# Patient Record
Sex: MALE | Race: WHITE | NOT HISPANIC OR LATINO | Employment: FULL TIME | URBAN - NONMETROPOLITAN AREA
[De-identification: names, ages, dates, MRNs, and addresses within clinical notes are randomized per-mention and may not be internally consistent; named-entity substitution may affect disease eponyms.]

---

## 2018-04-04 ENCOUNTER — OFFICE VISIT (OUTPATIENT)
Dept: INTERNAL MEDICINE | Facility: CLINIC | Age: 53
End: 2018-04-04

## 2018-04-04 VITALS
BODY MASS INDEX: 27.58 KG/M2 | SYSTOLIC BLOOD PRESSURE: 170 MMHG | OXYGEN SATURATION: 95 % | DIASTOLIC BLOOD PRESSURE: 110 MMHG | TEMPERATURE: 98.6 F | HEART RATE: 77 BPM | HEIGHT: 71 IN | WEIGHT: 197 LBS

## 2018-04-04 DIAGNOSIS — Z00.00 HEALTH MAINTENANCE EXAMINATION: ICD-10-CM

## 2018-04-04 DIAGNOSIS — Z72.0 TOBACCO ABUSE: ICD-10-CM

## 2018-04-04 DIAGNOSIS — I10 ESSENTIAL HYPERTENSION: Primary | ICD-10-CM

## 2018-04-04 PROCEDURE — 99214 OFFICE O/P EST MOD 30 MIN: CPT | Performed by: PHYSICIAN ASSISTANT

## 2018-04-04 PROCEDURE — 99406 BEHAV CHNG SMOKING 3-10 MIN: CPT | Performed by: PHYSICIAN ASSISTANT

## 2018-04-04 RX ORDER — LISINOPRIL 10 MG/1
10 TABLET ORAL DAILY
Qty: 30 TABLET | Refills: 5 | Status: SHIPPED | OUTPATIENT
Start: 2018-04-04 | End: 2018-11-15 | Stop reason: ALTCHOICE

## 2018-04-04 NOTE — PROGRESS NOTES
Subjective     Chief Complaint: elevated BP    History of Present Illness     Jamse Morel is a 52 y.o. male presenting with complaints of elevated BP. States his wife is a nurse, has been running 150s-170s systolic at home. Patient states he used to be on a BP med years ago, never had it refilled but blood pressure was always okay. Recently he notes an increase in stress, changes at work as well as changing from 3rd shift to 1st shift. HTN is leading to headaches, not fully relieved by tylenol. Denies any episodes of chest pain, shortness of breath, dizziness, blurry vision, palpitations, leg swelling. Denies increase in salt intake.    Also states he has felt more anxious recently, people are getting on his nerves more easily. States he was on lexapro 20-30 years ago but he didn't like the way it made him feel. Feels like if he could get the job switch and BP under control he will be less anxious.    Patient is also a smoker. Not ready to quit at this time.    The following portions of the patient's history were reviewed and updated as appropriate: current medications, allergies, PMH.    Review of Systems   Constitutional: Negative for appetite change, chills, fatigue, fever and unexpected weight change.   HENT: Negative for congestion, ear pain, hearing loss, nosebleeds, sinus pressure, sore throat, tinnitus and trouble swallowing.    Eyes: Negative for photophobia, discharge and visual disturbance.   Respiratory: Negative for cough, chest tightness, shortness of breath and wheezing.    Cardiovascular: Negative for chest pain, palpitations and leg swelling.   Gastrointestinal: Negative for abdominal distention, abdominal pain, blood in stool, constipation, diarrhea, nausea and vomiting.   Endocrine: Negative for cold intolerance, heat intolerance, polydipsia, polyphagia and polyuria.   Genitourinary: Negative for difficulty urinating, dysuria, flank pain, frequency, hematuria and urgency.   Musculoskeletal:  "Negative for arthralgias, back pain, joint swelling, myalgias, neck pain and neck stiffness.   Skin: Negative for color change, pallor, rash and wound.   Allergic/Immunologic: Negative for environmental allergies, food allergies and immunocompromised state.   Neurological: Positive for headaches. Negative for dizziness, weakness and numbness.   Hematological: Negative for adenopathy. Does not bruise/bleed easily.   Psychiatric/Behavioral: Negative for dysphoric mood, hallucinations, sleep disturbance and suicidal ideas. The patient is nervous/anxious.        Objective     Vitals:    04/04/18 1646   BP: (!) 170/110   Pulse: 77   Temp: 98.6 °F (37 °C)   SpO2: 95%   Weight: 89.4 kg (197 lb)   Height: 180.3 cm (71\")     Physical Exam   Constitutional: He is oriented to person, place, and time. He appears well-developed and well-nourished.   HENT:   Mouth/Throat: Oropharynx is clear and moist.   Eyes: Conjunctivae and EOM are normal. Pupils are equal, round, and reactive to light.   Neck: Normal range of motion. Neck supple.   Cardiovascular: Normal rate and regular rhythm.    Pulmonary/Chest: Effort normal and breath sounds normal.   Abdominal: Soft. Bowel sounds are normal.   Musculoskeletal: Normal range of motion. He exhibits no edema.   Lymphadenopathy:     He has no cervical adenopathy.   Neurological: He is alert and oriented to person, place, and time.   Skin: Skin is warm and dry.   Psychiatric: He has a normal mood and affect.       Assessment/Plan     Diagnoses and all orders for this visit:    Essential hypertension  -     lisinopril (PRINIVIL,ZESTRIL) 10 MG tablet; Take 1 tablet by mouth Daily.  -     CBC No Differential  -     Comprehensive Metabolic Panel  -     TSH  -     Lipid Panel    Health maintenance examination  -     CBC No Differential  -     Comprehensive Metabolic Panel  -     TSH  -     Lipid Panel    Tobacco abuse    Spent at least 5 minutes discussing risks associated with smoking, its effect " on BP, methods used to help quit. Patient states he may be interested in chantix.    We will start with lisinopril 10mg, will likely need to increase from there. Discussed reducing salt intake, healthy diet and exercise, reducing stress when possible.  Signs and symptoms of malignant HTN, MI, CVA, when to seek treatment discussed. Pt verbalized understanding.    Mercedes Damno PA-C  04/04/2018         Please note that portions of this note were completed with a voice recognition program. Efforts were made to edit dictation, but occasionally words are mistranscribed.

## 2018-11-15 ENCOUNTER — OFFICE VISIT (OUTPATIENT)
Dept: INTERNAL MEDICINE | Facility: CLINIC | Age: 53
End: 2018-11-15

## 2018-11-15 VITALS
WEIGHT: 197 LBS | OXYGEN SATURATION: 99 % | HEIGHT: 71 IN | SYSTOLIC BLOOD PRESSURE: 140 MMHG | TEMPERATURE: 98.3 F | HEART RATE: 79 BPM | BODY MASS INDEX: 27.58 KG/M2 | DIASTOLIC BLOOD PRESSURE: 98 MMHG

## 2018-11-15 DIAGNOSIS — I10 ESSENTIAL HYPERTENSION: Primary | ICD-10-CM

## 2018-11-15 DIAGNOSIS — Z71.6 ENCOUNTER FOR SMOKING CESSATION COUNSELING: ICD-10-CM

## 2018-11-15 DIAGNOSIS — Z23 NEED FOR HEPATITIS A IMMUNIZATION: ICD-10-CM

## 2018-11-15 DIAGNOSIS — R06.2 WHEEZING: ICD-10-CM

## 2018-11-15 PROCEDURE — 90632 HEPA VACCINE ADULT IM: CPT | Performed by: PHYSICIAN ASSISTANT

## 2018-11-15 PROCEDURE — 90471 IMMUNIZATION ADMIN: CPT | Performed by: PHYSICIAN ASSISTANT

## 2018-11-15 PROCEDURE — 99214 OFFICE O/P EST MOD 30 MIN: CPT | Performed by: PHYSICIAN ASSISTANT

## 2018-11-15 RX ORDER — ALBUTEROL SULFATE 90 UG/1
2 AEROSOL, METERED RESPIRATORY (INHALATION) EVERY 4 HOURS PRN
Qty: 1 INHALER | Refills: 11 | Status: SHIPPED | OUTPATIENT
Start: 2018-11-15 | End: 2018-12-14 | Stop reason: SDUPTHER

## 2018-11-15 RX ORDER — LOSARTAN POTASSIUM 50 MG/1
50 TABLET ORAL DAILY
Qty: 30 TABLET | Refills: 5 | Status: SHIPPED | OUTPATIENT
Start: 2018-11-15 | End: 2018-12-14 | Stop reason: SDUPTHER

## 2018-11-15 RX ORDER — VARENICLINE TARTRATE 1 MG/1
1 TABLET, FILM COATED ORAL 2 TIMES DAILY
Qty: 60 TABLET | Refills: 5 | Status: SHIPPED | OUTPATIENT
Start: 2018-11-15 | End: 2019-03-05

## 2018-11-15 NOTE — PROGRESS NOTES
James Morel is a 53 y.o. male.     Subjective   History of Present Illness   Here today for follow up of hypertension. Patient denies chest pain, dyspnea, orthopnea, palpitations, lower extremity edema, headaches, weakness, visual disturbances or confusion.  He believes he has developed a cough from Lisinopril.     Smoker of 1 ppd for the last 40 years.         The following portions of the patient's history were reviewed and updated as appropriate: allergies, current medications, past family history, past medical history, past social history, past surgical history and problem list.    Review of Systems    Constitutional: Negative for appetite change, chills, fatigue, fever and unexpected weight change.   HENT: Negative for congestion, ear pain, hearing loss, nosebleeds, postnasal drip, rhinorrhea, sore throat, tinnitus and trouble swallowing.    Eyes: Negative for photophobia, discharge and visual disturbance.   Respiratory: cough Negative for chest tightness, shortness of breath and wheezing.    Cardiovascular: Negative for chest pain, palpitations and leg swelling.   Gastrointestinal: Negative for abdominal distention, abdominal pain, blood in stool, constipation, diarrhea, nausea and vomiting.   Endocrine: Negative for cold intolerance, heat intolerance, polydipsia, polyphagia and polyuria.   Musculoskeletal: Negative for arthralgias, back pain, joint swelling, myalgias, neck pain and neck stiffness.   Skin: Negative for color change, pallor, rash and wound.   Allergic/Immunologic: Negative for environmental allergies, food allergies and immunocompromised state.   Neurological: Negative for dizziness, tremors, seizures, weakness, numbness and headaches.   Hematological: Negative for adenopathy. Does not bruise/bleed easily.   Psychiatric/Behavioral: Negative for sleep disturbances, agitation, behavioral problems, confusion, hallucinations, self-injury and suicidal ideas. The patient is not nervous/anxious.   "    Objective    Physical Exam  Constitutional: Appears well-developed and well-nourished.   HENT:   Head: Normocephalic and atraumatic.   Eyes: EOM are normal. Pupils are equal, round, and reactive to light.   Neck: Normal range of motion. Neck supple.   Cardiovascular: Normal rate, regular rhythm and normal heart sounds.    Pulmonary/Chest: diffuse wheezes. Effort normal and breath sounds normal. No respiratory distress.  Has no rales. Exhibits no chest wall tenderness.   Abdominal: Soft. Bowel sounds are normal. Exhibits no distension and no mass. There is no tenderness.   Musculoskeletal: Normal range of motion. Exhibits no tenderness.   Neurological: Alert and oriented to person, place, and time.   Skin: Skin is warm and dry.   Psychiatric: Has a normal mood and affect. Behavior is normal. Judgment and thought content normal.       /98   Pulse 79   Temp 98.3 °F (36.8 °C)   Ht 180.3 cm (70.98\")   Wt 89.4 kg (197 lb)   SpO2 99%   BMI 27.49 kg/m²     Nursing note and vitals reviewed.        Assessment/Plan   James was seen today for hypertension.    Diagnoses and all orders for this visit:    Essential hypertension  -     Change from lisinopril due to cough. losartan (COZAAR) 50 MG tablet; Take 1 tablet by mouth Daily.    Encounter for smoking cessation counseling  -     varenicline (CHANTIX STARTING MONTH GRAHAM) 0.5 MG X 11 & 1 MG X 42 tablet; Take 0.5 mg one daily on days 1-3 and and 0.5 mg twice daily on days 4-7.Then 1 mg twice daily for a total of 12 weeks.  -     varenicline (CHANTIX CONTINUING MONTH GRAHAM) 1 MG tablet; Take 1 tablet by mouth 2 (Two) Times a Day.    Wheezing  -     albuterol (PROVENTIL HFA;VENTOLIN HFA) 108 (90 Base) MCG/ACT inhaler; Inhale 2 puffs Every 4 (Four) Hours As Needed for Wheezing or Shortness of Air.    Need for hepatitis A immunization  -     Hepatitis A Vaccine Adult IM      Return in 1 month for HTN recheck and annual physical.          "

## 2018-12-14 ENCOUNTER — OFFICE VISIT (OUTPATIENT)
Dept: INTERNAL MEDICINE | Facility: CLINIC | Age: 53
End: 2018-12-14

## 2018-12-14 VITALS
TEMPERATURE: 98.4 F | HEART RATE: 83 BPM | DIASTOLIC BLOOD PRESSURE: 98 MMHG | SYSTOLIC BLOOD PRESSURE: 154 MMHG | BODY MASS INDEX: 27.58 KG/M2 | HEIGHT: 71 IN | WEIGHT: 197 LBS | OXYGEN SATURATION: 98 %

## 2018-12-14 DIAGNOSIS — I10 ESSENTIAL HYPERTENSION: ICD-10-CM

## 2018-12-14 DIAGNOSIS — F17.210 NICOTINE DEPENDENCE, CIGARETTES, UNCOMPLICATED: ICD-10-CM

## 2018-12-14 DIAGNOSIS — Z11.59 NEED FOR HEPATITIS C SCREENING TEST: ICD-10-CM

## 2018-12-14 DIAGNOSIS — R39.11 URINARY HESITANCY: ICD-10-CM

## 2018-12-14 DIAGNOSIS — Z12.11 SCREEN FOR COLON CANCER: ICD-10-CM

## 2018-12-14 DIAGNOSIS — Z12.2 ENCOUNTER FOR SCREENING FOR LUNG CANCER: ICD-10-CM

## 2018-12-14 DIAGNOSIS — Z00.00 ANNUAL PHYSICAL EXAM: Primary | ICD-10-CM

## 2018-12-14 DIAGNOSIS — R06.2 WHEEZING: ICD-10-CM

## 2018-12-14 DIAGNOSIS — Z00.00 PREVENTATIVE HEALTH CARE: ICD-10-CM

## 2018-12-14 PROCEDURE — 99396 PREV VISIT EST AGE 40-64: CPT | Performed by: PHYSICIAN ASSISTANT

## 2018-12-14 PROCEDURE — G0296 VISIT TO DETERM LDCT ELIG: HCPCS | Performed by: PHYSICIAN ASSISTANT

## 2018-12-14 RX ORDER — ALBUTEROL SULFATE 90 UG/1
2 AEROSOL, METERED RESPIRATORY (INHALATION) EVERY 4 HOURS PRN
Qty: 2 INHALER | Refills: 11 | Status: SHIPPED | OUTPATIENT
Start: 2018-12-14 | End: 2019-04-03 | Stop reason: SDUPTHER

## 2018-12-14 RX ORDER — LOSARTAN POTASSIUM 100 MG/1
100 TABLET ORAL DAILY
Qty: 30 TABLET | Refills: 5 | Status: SHIPPED | OUTPATIENT
Start: 2018-12-14 | End: 2019-09-18 | Stop reason: SDUPTHER

## 2018-12-14 NOTE — PROGRESS NOTES
Subjective   James Morel is a 53 y.o. male and is here for a comprehensive physical exam. The patient reports no new problems.    Has been using Chantix for around 2 weeks and noticed decent change in his desire to smoke.  He is currently smoking 1 pack(s) per day with a  40 pack year history.  Reports no use of alternate forms of tobacco, electronic cigarettes, marijuana or other substances.  Based on the recommendation of the [place] Preventive Services Task Force, this patient is at high risk for lung cancer and a low-dose CT screening scan is recommended.     The patient has had no hemoptysis, unintentional weight loss or increasing shortness of breath. The patient is asymptomatic and has no signs or symptoms of lung cancer.     Together we discussed the potential benefits and potential harms of being screened for lung cancer including the potential for follow up diagnostic testing, risk for over diagnosis, false positive rate and radiation exposure using the [facility]: Is Lung Cancer Screening Right for Me Decision Aid (Publication [id number], web site www.ahrq.gov). A copy of this decision aid resource has been provided in the after visit summary.  We also reviewed the patient's smoking history and counseled him on the importance and health benefits of stopping the use of tobacco products.      Cough has improved since change from lisinopril to losartan but not resolved. Still has postnasal drip.         Do you take any herbs or supplements that were not prescribed by a doctor? no, multi-vitamin  Are you taking calcium supplements? No  Are you taking aspirin daily? No, recommended he begin 81 mg aspirin daily.         History:  Patient receives prostate care here: Yes  Date last prostate exam: many years ago  Date last PSA: none  He reports Some decrease in urinary stream,  No nocturia, No dribbling, Some hesitancy.       has no sexual activity history on file.    The following portions of the  patient's history were reviewed and updated as appropriate: He  has no past medical history on file.  He does not have any pertinent problems on file.  He  has no past surgical history on file.  His family history includes Hypertension in his father and mother.  He  reports that he has been smoking cigarettes.  He has a 40.00 pack-year smoking history. His smokeless tobacco use includes snuff. He reports that he drinks alcohol. He reports that he does not use drugs.  Current Outpatient Medications   Medication Sig Dispense Refill   • albuterol 108 (90 Base) MCG/ACT inhaler Inhale 2 puffs Every 4 (Four) Hours As Needed for Wheezing or Shortness of Air. 2 inhaler 11   • losartan (COZAAR) 100 MG tablet Take 1 tablet by mouth Daily. 30 tablet 5   • varenicline (CHANTIX CONTINUING MONTH GRAHAM) 1 MG tablet Take 1 tablet by mouth 2 (Two) Times a Day. 60 tablet 5   • varenicline (CHANTIX STARTING MONTH PAK) 0.5 MG X 11 & 1 MG X 42 tablet Take 0.5 mg one daily on days 1-3 and and 0.5 mg twice daily on days 4-7.Then 1 mg twice daily for a total of 12 weeks. 53 tablet 0     No current facility-administered medications for this visit.    .    Review of Systems  Do you have pain that bothers you in your daily life? no    Review of Systems   Constitutional: Negative for appetite change, chills, fatigue, fever and unexpected weight change.   HENT: Negative for congestion, drooling, ear pain, hearing loss, nosebleeds, postnasal drip, rhinorrhea, sore throat, tinnitus and trouble swallowing.    Eyes: Negative for photophobia, pain, discharge and visual disturbance.   Respiratory: Positive for cough, shortness of breath (improved) and wheezing. Negative for chest tightness.    Cardiovascular: Negative for chest pain, palpitations and leg swelling.   Gastrointestinal: Negative for abdominal distention, abdominal pain, blood in stool, constipation, diarrhea, nausea and vomiting.   Endocrine: Negative for cold intolerance, heat  "intolerance, polydipsia, polyphagia and polyuria.   Genitourinary: Positive for difficulty urinating. Negative for decreased urine volume, dysuria, enuresis, flank pain, genital sores, penile pain and scrotal swelling.   Musculoskeletal: Negative for arthralgias, back pain, joint swelling, myalgias, neck pain and neck stiffness.   Skin: Negative for color change, pallor, rash and wound.   Allergic/Immunologic: Negative for environmental allergies, food allergies and immunocompromised state.   Neurological: Negative for dizziness, tremors, seizures, weakness, numbness and headaches.   Hematological: Negative for adenopathy. Does not bruise/bleed easily.   Psychiatric/Behavioral: Negative for agitation, behavioral problems, confusion, hallucinations, self-injury and suicidal ideas. The patient is not nervous/anxious.          Objective   /98   Pulse 83   Temp 98.4 °F (36.9 °C)   Ht 180.3 cm (70.98\")   Wt 89.4 kg (197 lb)   SpO2 98%   BMI 27.49 kg/m²     Physical Exam   Constitutional: He is oriented to person, place, and time. He appears well-developed and well-nourished. No distress.   HENT:   Head: Normocephalic and atraumatic.   Right Ear: External ear normal.   Left Ear: External ear normal.   Mouth/Throat: Oropharynx is clear and moist.   Eyes: EOM are normal. Pupils are equal, round, and reactive to light. No scleral icterus.   Neck: Normal range of motion. Neck supple. No thyromegaly present.   Cardiovascular: Normal rate, regular rhythm and normal heart sounds. Exam reveals no gallop and no friction rub.   No murmur heard.  Pulmonary/Chest: Effort normal. No stridor. No respiratory distress. He has wheezes (diffuse). He has no rales. He exhibits no tenderness.   Abdominal: Soft. Bowel sounds are normal. He exhibits no distension and no mass.   Musculoskeletal: Normal range of motion.   Lymphadenopathy:     He has no cervical adenopathy.   Neurological: He is alert and oriented to person, place, and " time. He displays normal reflexes. No cranial nerve deficit or sensory deficit.   Skin: Skin is warm and dry. Capillary refill takes less than 2 seconds. He is not diaphoretic. No pallor.   Psychiatric: He has a normal mood and affect. His behavior is normal. Judgment and thought content normal.   Nursing note and vitals reviewed.         Assessment/Plan   Healthy male exam.    1.    Diagnosis Plan   1. Annual physical exam     2. BMI 27.0-27.9,adult  Patient's Body mass index is 27.49 kg/m². BMI is above normal parameters. Recommendations include: exercise counseling and nutrition counseling.     3. Essential hypertension  Dose increase: losartan (COZAAR) 100 MG tablet    Comprehensive Metabolic Panel     4. Urinary hesitancy  PSA Screen     5. Wheezing  albuterol 108 (90 Base) MCG/ACT inhaler    CBC & Differential     6. Encounter for screening for lung cancer  -We discussed the NCCN guidelines for lung cancer screening and the patient verbalized understanding that annual screening is recommended until fifteen years beyond smoking as long as they have no other disease or comorbidity that would prevent them from receiving cancer treatments such as surgery should a lung cancer be detected.  After review of the NCCN guidelines and recommendations for ongoing screening, the patient verbalized understanding of recommendations for follow-up.  The patient has decided to proceed with a Low Dose Lung Cancer Screening CT today.      10 minutes face-to-face spent counseling patient on the continued health benefits of having quit tobacco, maintaining smoking abstinence, smoking cessation strategies and resources, as well as the importance of adherence to annual lung cancer low-dose CT screening.     7. Nicotine dependence, cigarettes, uncomplicated  CT chest low dose wo  -continue Chantix.      8. Preventative health care  Comprehensive Metabolic Panel    Lipid Panel     9. Screen for colon cancer  Ambulatory Referral to  General Surgery     10. Need for hepatitis C screening test  Hepatitis C Antibody       2. Patient Counseling:  --Nutrition: Stressed importance of moderation in sodium/caffeine intake, saturated fat and cholesterol, caloric balance, sufficient intake of fresh fruits, vegetables, fiber, calcium, iron,   --Discussed the issue of calcium supplement, and the daily use of baby aspirin if applicable.  --Exercise: Stressed the importance of regular exercise.   --Substance Abuse: Discussed cessation/primary prevention of tobacco (if applicable, alcohol, or other drug use (if applicable); driving or other dangerous activities under the influence; availability of treatment for abuse.    --Sexuality: Discussed sexually transmitted diseases, partner selection, use of condoms, avoidance of unintended pregnancy  and contraceptive alternatives.   --Injury prevention: Discussed safety belts, safety helmets, smoke detector, smoking near bedding or upholstery.   --Dental health: Discussed importance of regular tooth brushing, flossing, and dental visits.  --Immunizations reviewed.  --Discussed benefits of screening colonoscopy (if applicable).  --After hours service discussed with patient.    3. Discussed the patient's BMI with him.  The BMI is above average; BMI management plan is completed  4. Return in about 3 months (around 3/14/2019) for Next scheduled follow up.

## 2018-12-17 ENCOUNTER — TELEPHONE (OUTPATIENT)
Dept: SURGERY | Facility: CLINIC | Age: 53
End: 2018-12-17

## 2019-03-05 ENCOUNTER — OFFICE VISIT (OUTPATIENT)
Dept: INTERNAL MEDICINE | Facility: CLINIC | Age: 54
End: 2019-03-05

## 2019-03-05 VITALS
SYSTOLIC BLOOD PRESSURE: 132 MMHG | OXYGEN SATURATION: 99 % | WEIGHT: 207.8 LBS | HEIGHT: 71 IN | BODY MASS INDEX: 29.09 KG/M2 | DIASTOLIC BLOOD PRESSURE: 86 MMHG | HEART RATE: 83 BPM | TEMPERATURE: 97.9 F

## 2019-03-05 DIAGNOSIS — I10 ESSENTIAL HYPERTENSION: Primary | ICD-10-CM

## 2019-03-05 DIAGNOSIS — R10.13 EPIGASTRIC PAIN: ICD-10-CM

## 2019-03-05 DIAGNOSIS — R05.9 COUGH: ICD-10-CM

## 2019-03-05 PROCEDURE — 99214 OFFICE O/P EST MOD 30 MIN: CPT | Performed by: INTERNAL MEDICINE

## 2019-03-05 NOTE — PROGRESS NOTES
Subjective   James Morel is a 53 y.o. male.     Chief Complaint   Patient presents with   • Abdominal Pain     Pt states that his upper abdomen has hurt X 2 weeks. He also complains of edema in both lower legs.        History of Present Illness   Patient is a 54 y/o male presenting today for upper abdominal pain. Pain started 2 weeks ago. Pain is in epigastric and RUQ. Pain is described as sharp and burning. Pain is intermittent and rated 8/10. Pain is worst in afternoon and with food. Patient has tried tums and mylanta without relief. No nausea or vomiting. Appetite is normal. No change in bowel movements. No hematochezia. No previous abdominal surgeries. No abdominal history of crohn's, UC, or IBS. Patient also complains of BLE edema. Edema started around Andrew time. Denies history of CHF or MI.   Patient denies chest pain, SOA, fever or chill.     Current Outpatient Medications:   •  albuterol 108 (90 Base) MCG/ACT inhaler, Inhale 2 puffs Every 4 (Four) Hours As Needed for Wheezing or Shortness of Air., Disp: 2 inhaler, Rfl: 11  •  losartan (COZAAR) 100 MG tablet, Take 1 tablet by mouth Daily., Disp: 30 tablet, Rfl: 5    The following portions of the patient's history were reviewed and updated as appropriate: allergies, current medications, past family history, past medical history, past social history, past surgical history and problem list.    Review of Systems   Constitutional: Negative.    HENT: Negative.    Respiratory: Negative.    Cardiovascular: Negative.    Gastrointestinal: Positive for abdominal pain. Negative for abdominal distention, blood in stool, constipation, diarrhea, nausea and vomiting.   Genitourinary: Positive for difficulty urinating. Negative for dysuria, flank pain, frequency, hematuria and urgency.   Neurological: Negative.        Objective   Physical Exam   Constitutional: He appears well-developed and well-nourished. No distress.   HENT:   Head: Normocephalic and atraumatic.    Eyes: Conjunctivae and EOM are normal.   Neck: Normal range of motion.   Cardiovascular: Normal rate, regular rhythm and normal heart sounds.   Pulmonary/Chest: Effort normal and breath sounds normal. No stridor. No respiratory distress. He has no wheezes. He has no rales.   Abdominal: Soft. Bowel sounds are normal. He exhibits no distension and no ascites. There is no hepatosplenomegaly. There is tenderness in the right upper quadrant and epigastric area. There is no CVA tenderness and no tenderness at McBurney's point.           All tests have been reviewed.    Assessment/Plan   Diagnoses and all orders for this visit:    Essential hypertension    Epigastric pain  -     CBC & Differential  -     Comprehensive Metabolic Panel  -     Lipase  -     CT Abdomen Pelvis With & Without Contrast    Cough  -     XR Chest PA & Lateral      etoh abuse  anxiety  RUQ abdominal pain:      follow up depending on result. With others  To ER if worse  3 weeks

## 2019-03-07 ENCOUNTER — TELEPHONE (OUTPATIENT)
Dept: INTERNAL MEDICINE | Facility: CLINIC | Age: 54
End: 2019-03-07

## 2019-03-07 NOTE — TELEPHONE ENCOUNTER
Patient called asking to speak with dr michelle if possible about her . She would not go into details. She states she needs to discuss his last visit.

## 2019-03-07 NOTE — TELEPHONE ENCOUNTER
I have tried to call pt or his wife at their house number with no answer, left VM for pt to call.

## 2019-03-08 ENCOUNTER — HOSPITAL ENCOUNTER (OUTPATIENT)
Dept: CT IMAGING | Facility: HOSPITAL | Age: 54
Discharge: HOME | End: 2019-03-08

## 2019-03-08 ENCOUNTER — HOSPITAL ENCOUNTER (OUTPATIENT)
Dept: GENERAL RADIOLOGY | Facility: HOSPITAL | Age: 54
Discharge: HOME | End: 2019-03-08
Admitting: RADIOLOGY

## 2019-03-08 ENCOUNTER — APPOINTMENT (OUTPATIENT)
Dept: LAB | Facility: HOSPITAL | Age: 54
End: 2019-03-08

## 2019-03-08 DIAGNOSIS — K80.20 CALCULUS OF GALLBLADDER WITHOUT CHOLECYSTITIS WITHOUT OBSTRUCTION: Primary | ICD-10-CM

## 2019-03-08 DIAGNOSIS — R10.9 ABDOMINAL PAIN, UNSPECIFIED ABDOMINAL LOCATION: ICD-10-CM

## 2019-03-08 LAB
ALBUMIN SERPL-MCNC: 4.5 G/DL (ref 3.5–5)
ALBUMIN/GLOB SERPL: 1.6 G/DL (ref 1–2)
ALP SERPL-CCNC: 54 U/L (ref 38–126)
ALT SERPL W P-5'-P-CCNC: 23 U/L (ref 13–69)
ANION GAP SERPL CALCULATED.3IONS-SCNC: 11.1 MMOL/L (ref 10–20)
AST SERPL-CCNC: 22 U/L (ref 15–46)
BASOPHILS # BLD AUTO: 0.1 10*3/MM3 (ref 0–0.2)
BASOPHILS NFR BLD AUTO: 1.2 % (ref 0–2.5)
BILIRUB SERPL-MCNC: 0.7 MG/DL (ref 0.2–1.3)
BUN BLD-MCNC: 14 MG/DL (ref 7–20)
BUN/CREAT SERPL: 14 (ref 6.3–21.9)
CALCIUM SPEC-SCNC: 9.8 MG/DL (ref 8.4–10.2)
CHLORIDE SERPL-SCNC: 103 MMOL/L (ref 98–107)
CO2 SERPL-SCNC: 30 MMOL/L (ref 26–30)
CREAT BLD-MCNC: 1 MG/DL (ref 0.6–1.3)
DEPRECATED RDW RBC AUTO: 50.2 FL (ref 37–54)
EOSINOPHIL # BLD AUTO: 0.44 10*3/MM3 (ref 0–0.7)
EOSINOPHIL NFR BLD AUTO: 5.4 % (ref 0–7)
ERYTHROCYTE [DISTWIDTH] IN BLOOD BY AUTOMATED COUNT: 13.6 % (ref 11.5–14.5)
GFR SERPL CREATININE-BSD FRML MDRD: 78 ML/MIN/1.73
GLOBULIN UR ELPH-MCNC: 2.8 GM/DL
GLUCOSE BLD-MCNC: 94 MG/DL (ref 74–98)
HCT VFR BLD AUTO: 48.9 % (ref 42–52)
HGB BLD-MCNC: 16.4 G/DL (ref 14–18)
IMM GRANULOCYTES # BLD AUTO: 0.02 10*3/MM3 (ref 0–0.06)
IMM GRANULOCYTES NFR BLD AUTO: 0.2 % (ref 0–0.6)
LIPASE SERPL-CCNC: 94 U/L (ref 23–300)
LYMPHOCYTES # BLD AUTO: 2.04 10*3/MM3 (ref 0.6–3.4)
LYMPHOCYTES NFR BLD AUTO: 25.2 % (ref 10–50)
MCH RBC QN AUTO: 33.1 PG (ref 27–31)
MCHC RBC AUTO-ENTMCNC: 33.5 G/DL (ref 30–37)
MCV RBC AUTO: 98.6 FL (ref 80–94)
MONOCYTES # BLD AUTO: 0.74 10*3/MM3 (ref 0–0.9)
MONOCYTES NFR BLD AUTO: 9.2 % (ref 0–12)
NEUTROPHILS # BLD AUTO: 4.74 10*3/MM3 (ref 2–6.9)
NEUTROPHILS NFR BLD AUTO: 58.8 % (ref 37–80)
NRBC BLD AUTO-RTO: 0 /100 WBC (ref 0–0)
PLATELET # BLD AUTO: 275 10*3/MM3 (ref 130–400)
PMV BLD AUTO: 9.7 FL (ref 6–12)
POTASSIUM BLD-SCNC: 4.1 MMOL/L (ref 3.5–5.1)
PROT SERPL-MCNC: 7.3 G/DL (ref 6.3–8.2)
RBC # BLD AUTO: 4.96 10*6/MM3 (ref 4.7–6.1)
SODIUM BLD-SCNC: 140 MMOL/L (ref 137–145)
WBC NRBC COR # BLD: 8.08 10*3/MM3 (ref 4.8–10.8)

## 2019-03-08 PROCEDURE — 36415 COLL VENOUS BLD VENIPUNCTURE: CPT | Performed by: INTERNAL MEDICINE

## 2019-03-08 PROCEDURE — 85025 COMPLETE CBC W/AUTO DIFF WBC: CPT | Performed by: INTERNAL MEDICINE

## 2019-03-08 PROCEDURE — 71046 X-RAY EXAM CHEST 2 VIEWS: CPT

## 2019-03-08 PROCEDURE — 83690 ASSAY OF LIPASE: CPT | Performed by: INTERNAL MEDICINE

## 2019-03-08 PROCEDURE — 80053 COMPREHEN METABOLIC PANEL: CPT | Performed by: INTERNAL MEDICINE

## 2019-03-08 PROCEDURE — 74178 CT ABD&PLV WO CNTR FLWD CNTR: CPT

## 2019-03-08 RX ADMIN — BARIUM SULFATE 450 ML: 21 SUSPENSION ORAL at 11:05

## 2019-03-11 ENCOUNTER — TELEPHONE (OUTPATIENT)
Dept: INTERNAL MEDICINE | Facility: CLINIC | Age: 54
End: 2019-03-11

## 2019-03-11 NOTE — TELEPHONE ENCOUNTER
Pt wife calling stating that the doctor had told the pt Friday that he would need to get gallbladder removed. She is requesting a call back to speak to doctor or nurse regarding getting the patient worked in to the hospital for surgery. Please advise.

## 2019-03-12 ENCOUNTER — OFFICE VISIT (OUTPATIENT)
Dept: SURGERY | Facility: CLINIC | Age: 54
End: 2019-03-12

## 2019-03-12 VITALS
HEIGHT: 71 IN | WEIGHT: 203 LBS | OXYGEN SATURATION: 98 % | TEMPERATURE: 98.5 F | SYSTOLIC BLOOD PRESSURE: 136 MMHG | BODY MASS INDEX: 28.42 KG/M2 | HEART RATE: 89 BPM | DIASTOLIC BLOOD PRESSURE: 80 MMHG

## 2019-03-12 DIAGNOSIS — K86.89 PANCREATIC MASS: ICD-10-CM

## 2019-03-12 DIAGNOSIS — K80.20 CALCULUS OF GALLBLADDER WITHOUT CHOLECYSTITIS WITHOUT OBSTRUCTION: Primary | ICD-10-CM

## 2019-03-12 PROCEDURE — 99244 OFF/OP CNSLTJ NEW/EST MOD 40: CPT | Performed by: SURGERY

## 2019-03-12 RX ORDER — HYDROCODONE BITARTRATE AND ACETAMINOPHEN 5; 325 MG/1; MG/1
1-2 TABLET ORAL EVERY 6 HOURS PRN
Qty: 12 TABLET | Refills: 0 | Status: SHIPPED | OUTPATIENT
Start: 2019-03-12 | End: 2019-03-14 | Stop reason: HOSPADM

## 2019-03-12 RX ORDER — SODIUM CHLORIDE 9 MG/ML
100 INJECTION, SOLUTION INTRAVENOUS CONTINUOUS
Status: CANCELLED | OUTPATIENT
Start: 2019-03-14

## 2019-03-12 NOTE — PROGRESS NOTES
Patient: James Morel    YOB: 1965    Date: 03/12/2019    Primary Care Provider: Arya Brewster MD    Chief Complaint   Patient presents with   • Abdominal Pain       SUBJECTIVE:    History of present illness: With at least one month history of abdominal pain.  It is essentially daily and more or less constant now.  Crampy fullness in the right upper quadrant but does not radiate.  Eating makes it worse.  Moderate in intensity.  Associated nausea and vomiting.    The following portions of the patient's history were reviewed and updated as appropriate: allergies, current medications, past family history, past medical history, past social history, past surgical history and problem list.    Review of Systems   Constitutional: Negative for chills, fever and unexpected weight change.   HENT: Negative for trouble swallowing and voice change.    Eyes: Negative for visual disturbance.   Respiratory: Negative for apnea, cough, chest tightness, shortness of breath and wheezing.    Cardiovascular: Negative for chest pain, palpitations and leg swelling.   Gastrointestinal: Positive for abdominal pain, constipation, diarrhea, nausea and vomiting. Negative for abdominal distention and rectal pain.   Endocrine: Negative for cold intolerance and heat intolerance.   Genitourinary: Negative for difficulty urinating, dysuria, flank pain, scrotal swelling and testicular pain.   Musculoskeletal: Negative for back pain, gait problem and joint swelling.   Skin: Negative for color change, rash and wound.   Neurological: Negative for dizziness, syncope, speech difficulty, weakness, numbness and headaches.   Hematological: Negative for adenopathy. Does not bruise/bleed easily.   Psychiatric/Behavioral: Negative for confusion. The patient is not nervous/anxious.        History:  History reviewed. No pertinent past medical history.    History reviewed. No pertinent surgical history.    Family History   Problem Relation Age of  "Onset   • Hypertension Mother    • Hypertension Father        Social History     Tobacco Use   • Smoking status: Current Every Day Smoker     Packs/day: 1.00     Years: 40.00     Pack years: 40.00     Types: Cigarettes   • Smokeless tobacco: Current User     Types: Snuff   Substance Use Topics   • Alcohol use: Yes     Comment: occasional   • Drug use: No       Allergies:  No Known Allergies    Medications:    Current Outpatient Medications:   •  albuterol 108 (90 Base) MCG/ACT inhaler, Inhale 2 puffs Every 4 (Four) Hours As Needed for Wheezing or Shortness of Air., Disp: 2 inhaler, Rfl: 11  •  losartan (COZAAR) 100 MG tablet, Take 1 tablet by mouth Daily., Disp: 30 tablet, Rfl: 5    OBJECTIVE:    Vital Signs:   Vitals:    03/12/19 1020   BP: 136/80   Pulse: 89   Temp: 98.5 °F (36.9 °C)   SpO2: 98%   Weight: 92.1 kg (203 lb)   Height: 180.3 cm (70.98\")       Physical Exam:   General Appearance:    Alert, cooperative, in no acute distress   Head:    Normocephalic, without obvious abnormality, atraumatic   Eyes:            Normal.  No scleral icterus.  PERRLA    Lungs:     Clear to auscultation,respirations regular, even and                  unlabored    Heart:    Regular rhythm and normal rate, normal S1 and S2, no            murmur   Abdomen:     Normal bowel sounds, no masses, no organomegaly, soft        , non-distended, no guarding, tender in the right upper quadrant   Extremities:   Moves all extremities well, no edema, no cyanosis, no             redness   Skin:   No bleeding, bruising or rash   Neurologic:   Normal without gross deficits.   Psychiatric: No evidence of depression or anxiety        Results Review:   I reviewed the patient's new clinical results.  Labs and CT reviewed    Review of Systems was reviewed and confirmed as accurate today.    ASSESSMENT/PLAN:    1. Calculus of gallbladder without cholecystitis without obstruction    2. Pancreatic mass        Patient with likely symptomatic " cholelithiasis.  I offered him both observation with a low-fat diet versus a laparoscopic cholecystectomy.  He is not interested in conservative management since he has pain even without eating.  I explained this procedure to them in detail and they understand the procedure.  They also understand the risks of bleeding, infection, bile leak, ductal injury, bowel injury, the possibility of converting to an open procedure etc. They understand all of these risks and I have answered all of their questions and they wish to proceed.    Patient will also need a repeat CT scan in 6 months due to the pancreatic apparent cystic lesion.    Electronically signed by Ollie Downey MD  03/12/19

## 2019-03-13 ENCOUNTER — APPOINTMENT (OUTPATIENT)
Dept: PREADMISSION TESTING | Facility: HOSPITAL | Age: 54
End: 2019-03-13

## 2019-03-13 VITALS
BODY MASS INDEX: 28.42 KG/M2 | HEART RATE: 77 BPM | DIASTOLIC BLOOD PRESSURE: 85 MMHG | WEIGHT: 203 LBS | HEIGHT: 71 IN | OXYGEN SATURATION: 98 % | SYSTOLIC BLOOD PRESSURE: 141 MMHG

## 2019-03-13 PROCEDURE — 93005 ELECTROCARDIOGRAM TRACING: CPT | Performed by: SURGERY

## 2019-03-14 ENCOUNTER — APPOINTMENT (OUTPATIENT)
Dept: GENERAL RADIOLOGY | Facility: HOSPITAL | Age: 54
End: 2019-03-14

## 2019-03-14 ENCOUNTER — HOSPITAL ENCOUNTER (OUTPATIENT)
Facility: HOSPITAL | Age: 54
Setting detail: HOSPITAL OUTPATIENT SURGERY
Discharge: HOME | End: 2019-03-14
Attending: SURGERY | Admitting: SURGERY

## 2019-03-14 ENCOUNTER — ANESTHESIA EVENT (OUTPATIENT)
Dept: PERIOP | Facility: HOSPITAL | Age: 54
End: 2019-03-14

## 2019-03-14 ENCOUNTER — ANESTHESIA (OUTPATIENT)
Dept: PERIOP | Facility: HOSPITAL | Age: 54
End: 2019-03-14

## 2019-03-14 VITALS
SYSTOLIC BLOOD PRESSURE: 118 MMHG | TEMPERATURE: 98.4 F | RESPIRATION RATE: 18 BRPM | OXYGEN SATURATION: 97 % | HEART RATE: 66 BPM | DIASTOLIC BLOOD PRESSURE: 81 MMHG

## 2019-03-14 DIAGNOSIS — K80.20 CALCULUS OF GALLBLADDER WITHOUT CHOLECYSTITIS WITHOUT OBSTRUCTION: ICD-10-CM

## 2019-03-14 PROCEDURE — 25010000002 DEXAMETHASONE PER 1 MG: Performed by: NURSE ANESTHETIST, CERTIFIED REGISTERED

## 2019-03-14 PROCEDURE — 47563 LAPARO CHOLECYSTECTOMY/GRAPH: CPT | Performed by: SURGERY

## 2019-03-14 PROCEDURE — 25010000002 ONDANSETRON PER 1 MG: Performed by: NURSE ANESTHETIST, CERTIFIED REGISTERED

## 2019-03-14 PROCEDURE — 25010000002 HYDROMORPHONE 1 MG/ML SOLUTION

## 2019-03-14 PROCEDURE — 25010000002 PROPOFOL 200 MG/20ML EMULSION: Performed by: NURSE ANESTHETIST, CERTIFIED REGISTERED

## 2019-03-14 PROCEDURE — 94799 UNLISTED PULMONARY SVC/PX: CPT

## 2019-03-14 PROCEDURE — 94640 AIRWAY INHALATION TREATMENT: CPT

## 2019-03-14 PROCEDURE — 25010000003 AMPICILLIN-SULBACTAM PER 1.5 G: Performed by: SURGERY

## 2019-03-14 PROCEDURE — 25010000002 FENTANYL CITRATE (PF) 250 MCG/5ML SOLUTION: Performed by: NURSE ANESTHETIST, CERTIFIED REGISTERED

## 2019-03-14 PROCEDURE — 74300 X-RAY BILE DUCTS/PANCREAS: CPT

## 2019-03-14 PROCEDURE — 25010000002 FENTANYL CITRATE (PF) 100 MCG/2ML SOLUTION: Performed by: NURSE ANESTHETIST, CERTIFIED REGISTERED

## 2019-03-14 RX ORDER — HYDROCODONE BITARTRATE AND ACETAMINOPHEN 5; 325 MG/1; MG/1
1-2 TABLET ORAL EVERY 4 HOURS PRN
Qty: 18 TABLET | Refills: 0 | Status: SHIPPED | OUTPATIENT
Start: 2019-03-14 | End: 2020-05-21

## 2019-03-14 RX ORDER — DEXAMETHASONE SODIUM PHOSPHATE 4 MG/ML
INJECTION, SOLUTION INTRA-ARTICULAR; INTRALESIONAL; INTRAMUSCULAR; INTRAVENOUS; SOFT TISSUE AS NEEDED
Status: DISCONTINUED | OUTPATIENT
Start: 2019-03-14 | End: 2019-03-14 | Stop reason: SURG

## 2019-03-14 RX ORDER — ONDANSETRON 2 MG/ML
INJECTION INTRAMUSCULAR; INTRAVENOUS AS NEEDED
Status: DISCONTINUED | OUTPATIENT
Start: 2019-03-14 | End: 2019-03-14 | Stop reason: SURG

## 2019-03-14 RX ORDER — SODIUM CHLORIDE 0.9 % (FLUSH) 0.9 %
3 SYRINGE (ML) INJECTION AS NEEDED
Status: DISCONTINUED | OUTPATIENT
Start: 2019-03-14 | End: 2019-03-14 | Stop reason: HOSPADM

## 2019-03-14 RX ORDER — SODIUM CHLORIDE 9 MG/ML
100 INJECTION, SOLUTION INTRAVENOUS CONTINUOUS
Status: DISCONTINUED | OUTPATIENT
Start: 2019-03-14 | End: 2019-03-14 | Stop reason: HOSPADM

## 2019-03-14 RX ORDER — PROMETHAZINE HYDROCHLORIDE 12.5 MG/1
12.5 TABLET ORAL ONCE AS NEEDED
Status: DISCONTINUED | OUTPATIENT
Start: 2019-03-14 | End: 2019-03-14 | Stop reason: HOSPADM

## 2019-03-14 RX ORDER — IPRATROPIUM BROMIDE AND ALBUTEROL SULFATE 2.5; .5 MG/3ML; MG/3ML
3 SOLUTION RESPIRATORY (INHALATION) ONCE
Status: COMPLETED | OUTPATIENT
Start: 2019-03-14 | End: 2019-03-14

## 2019-03-14 RX ORDER — FENTANYL CITRATE 50 UG/ML
INJECTION, SOLUTION INTRAMUSCULAR; INTRAVENOUS AS NEEDED
Status: DISCONTINUED | OUTPATIENT
Start: 2019-03-14 | End: 2019-03-14 | Stop reason: SURG

## 2019-03-14 RX ORDER — MORPHINE SULFATE 2 MG/ML
2 INJECTION, SOLUTION INTRAMUSCULAR; INTRAVENOUS
Status: DISCONTINUED | OUTPATIENT
Start: 2019-03-14 | End: 2019-03-14 | Stop reason: HOSPADM

## 2019-03-14 RX ORDER — ROCURONIUM BROMIDE 10 MG/ML
INJECTION, SOLUTION INTRAVENOUS AS NEEDED
Status: DISCONTINUED | OUTPATIENT
Start: 2019-03-14 | End: 2019-03-14 | Stop reason: SURG

## 2019-03-14 RX ORDER — PROPOFOL 10 MG/ML
INJECTION, EMULSION INTRAVENOUS AS NEEDED
Status: DISCONTINUED | OUTPATIENT
Start: 2019-03-14 | End: 2019-03-14 | Stop reason: SURG

## 2019-03-14 RX ORDER — GLYCOPYRROLATE 0.2 MG/ML
INJECTION INTRAMUSCULAR; INTRAVENOUS AS NEEDED
Status: DISCONTINUED | OUTPATIENT
Start: 2019-03-14 | End: 2019-03-14 | Stop reason: SURG

## 2019-03-14 RX ORDER — ONDANSETRON 4 MG/1
4 TABLET, FILM COATED ORAL EVERY 8 HOURS PRN
Qty: 10 TABLET | Refills: 1 | Status: SHIPPED | OUTPATIENT
Start: 2019-03-14 | End: 2019-03-21

## 2019-03-14 RX ORDER — ONDANSETRON 2 MG/ML
4 INJECTION INTRAMUSCULAR; INTRAVENOUS ONCE AS NEEDED
Status: DISCONTINUED | OUTPATIENT
Start: 2019-03-14 | End: 2019-03-14 | Stop reason: HOSPADM

## 2019-03-14 RX ORDER — NEOSTIGMINE METHYLSULFATE 5 MG/5 ML
SYRINGE (ML) INTRAVENOUS AS NEEDED
Status: DISCONTINUED | OUTPATIENT
Start: 2019-03-14 | End: 2019-03-14 | Stop reason: SURG

## 2019-03-14 RX ORDER — LABETALOL HYDROCHLORIDE 5 MG/ML
INJECTION, SOLUTION INTRAVENOUS AS NEEDED
Status: DISCONTINUED | OUTPATIENT
Start: 2019-03-14 | End: 2019-03-14 | Stop reason: SURG

## 2019-03-14 RX ADMIN — SODIUM CHLORIDE 100 ML/HR: 9 INJECTION, SOLUTION INTRAVENOUS at 08:26

## 2019-03-14 RX ADMIN — ROCURONIUM BROMIDE 40 MG: 10 INJECTION INTRAVENOUS at 09:19

## 2019-03-14 RX ADMIN — DEXAMETHASONE SODIUM PHOSPHATE 4 MG: 4 INJECTION, SOLUTION INTRAMUSCULAR; INTRAVENOUS at 09:19

## 2019-03-14 RX ADMIN — Medication 5 MG: at 10:20

## 2019-03-14 RX ADMIN — LABETALOL HYDROCHLORIDE 10 MG: 5 INJECTION, SOLUTION INTRAVENOUS at 10:00

## 2019-03-14 RX ADMIN — SODIUM CHLORIDE 3 G: 9 INJECTION, SOLUTION INTRAVENOUS at 09:09

## 2019-03-14 RX ADMIN — LIDOCAINE HYDROCHLORIDE 60 MG: 20 INJECTION, SOLUTION INTRAVENOUS at 09:19

## 2019-03-14 RX ADMIN — FENTANYL CITRATE 100 MCG: 50 INJECTION, SOLUTION INTRAMUSCULAR; INTRAVENOUS at 09:19

## 2019-03-14 RX ADMIN — PROPOFOL 200 MG: 10 INJECTION, EMULSION INTRAVENOUS at 09:19

## 2019-03-14 RX ADMIN — Medication 0.5 MG: at 10:35

## 2019-03-14 RX ADMIN — SODIUM CHLORIDE: 9 INJECTION, SOLUTION INTRAVENOUS at 10:25

## 2019-03-14 RX ADMIN — ONDANSETRON 4 MG: 2 INJECTION INTRAMUSCULAR; INTRAVENOUS at 09:19

## 2019-03-14 RX ADMIN — GLYCOPYRROLATE 0.8 MG: 0.2 INJECTION, SOLUTION INTRAMUSCULAR; INTRAVENOUS at 10:20

## 2019-03-14 RX ADMIN — HYDROMORPHONE HYDROCHLORIDE 0.5 MG: 1 INJECTION, SOLUTION INTRAMUSCULAR; INTRAVENOUS; SUBCUTANEOUS at 10:35

## 2019-03-14 RX ADMIN — SODIUM CHLORIDE: 9 INJECTION, SOLUTION INTRAVENOUS at 09:51

## 2019-03-14 RX ADMIN — IPRATROPIUM BROMIDE AND ALBUTEROL SULFATE 3 ML: .5; 3 SOLUTION RESPIRATORY (INHALATION) at 08:41

## 2019-03-14 RX ADMIN — FENTANYL CITRATE 250 MCG: 50 INJECTION, SOLUTION INTRAMUSCULAR; INTRAVENOUS at 09:33

## 2019-03-14 NOTE — ANESTHESIA PROCEDURE NOTES
Airway  Urgency: elective    Airway not difficult    General Information and Staff    Patient location during procedure: OR  CRNA: Basim Urbina CRNA    Indications and Patient Condition  Indications for airway management: airway protection    Preoxygenated: yes  Mask difficulty assessment: 1 - vent by mask    Final Airway Details  Final airway type: endotracheal airway      Successful airway: ETT  Cuffed: yes   Successful intubation technique: direct laryngoscopy  Endotracheal tube insertion site: oral  Blade: Wayne  Blade size: 3  ETT size (mm): 7.5  Cormack-Lehane Classification: grade I - full view of glottis  Placement verified by: chest auscultation and capnometry   Measured from: lips  ETT to lips (cm): 21  Number of attempts at approach: 1

## 2019-03-14 NOTE — ANESTHESIA PREPROCEDURE EVALUATION
Anesthesia Evaluation     Patient summary reviewed and Nursing notes reviewed   no history of anesthetic complications:  NPO Solid Status: > 8 hours  NPO Liquid Status: > 8 hours           Airway   Mallampati: II  TM distance: >3 FB  Neck ROM: full  No difficulty expected  Dental - normal exam     Pulmonary    (+) COPD, asthma, shortness of breath, decreased breath sounds,   Cardiovascular - normal exam    (+) hypertension,       Neuro/Psych  GI/Hepatic/Renal/Endo      Musculoskeletal     (+) arthralgias, chronic pain, myalgias,   Abdominal  - normal exam   Substance History   (+) alcohol use,      OB/GYN          Other        ROS/Med Hx Other: Labs reviewed  cxr nad                  Anesthesia Plan    ASA 3     general   (Risks and benefits discussed including risk of aspiration, recall and dental damage. All patient questions answered. Will continue with POC.)  intravenous induction   Anesthetic plan, all risks, benefits, and alternatives have been provided, discussed and informed consent has been obtained with: patient.

## 2019-03-14 NOTE — ANESTHESIA POSTPROCEDURE EVALUATION
Patient: James Morel    Procedure Summary     Date:  03/14/19 Room / Location:  [facility] OR  /  [facility]    Anesthesia Start:  0909 Anesthesia Stop:  1030    Procedure:  CHOLECYSTECTOMY LAPAROSCOPIC INTRAOPERATIVE CHOLANGIOGRAPHY (N/A Abdomen) Diagnosis:       Calculus of gallbladder without cholecystitis without obstruction      (Calculus of gallbladder without cholecystitis without obstruction [K80.20])    Surgeon:  Ollie Downey MD Provider:  Basim Urbina CRNA    Anesthesia Type:  general ASA Status:  3          Anesthesia Type: general  Last vitals  BP   118/81 (03/14/19 1205)   Temp   98.4 °F (36.9 °C) (03/14/19 1135)   Pulse   66 (03/14/19 1205)   Resp   18 (03/14/19 1205)     SpO2   97 % (03/14/19 1205)     Post Anesthesia Care and Evaluation    Patient location during evaluation: PACU  Patient participation: complete - patient participated  Level of consciousness: awake  Pain score: 3  Pain management: adequate  Airway patency: patent  Anesthetic complications: No anesthetic complications  PONV Status: controlled  Cardiovascular status: acceptable and stable  Respiratory status: acceptable and face mask  Hydration status: acceptable

## 2019-03-14 NOTE — DISCHARGE INSTRUCTIONS
Please follow all post op instructions and follow up appointment time from your physician's office included in your discharge packet.     Rest today  No pushing,pulling,tugging,heavy lifting, or strenuous activity   No major decision making,driving,or drinking alcoholic beverages for 24 hours due to the sedation you received  Always use good hand hygiene/washing technique  No driving on pain medication.    To assist you in voiding:  Drink plenty of fluids  Listen to running water while attempting to void.    If you are unable to urinate and you have an uncomfortable urge to void or it has been   6 hours since you were discharged, return to the Emergency Room.    Turn, cough, and deep breath every 2 hours and use incentive spirometer every 3 hours while awake to prevent post-op complications of infection or pneumonia.      May splint abdomen when moving, coughing, sneezing, laughing, or increasing activity as comfort measure.     Apply ice to incision sites as physician ordered (usually 20-30 minutes), remove, and reapply in 2-3 hours.  Do not use ice continuously.

## 2019-03-14 NOTE — OP NOTE
PATIENT:     James Morel    DATE OF SURGERY:     3/14/2019    PHYSICIAN:   Ollie Downey MD    REFERRING PHYSICIAN:  Arya Brewster MD     YOB: 1965    PREOPERATIVE DIAGNOSIS:  Chronic cholecystitis due to Cholelithiasis    POSTOPERATIVE DIAGNOSIS:  Chronic cholecystitis due to Cholelithiasis    PROCEDURE:  Laparoscopic cholecystectomy with intraoperative cholangiography.    ANESTHESIA:  General endotracheal.    OPERATIVE PROCEDURE:  The patient was taken to the operating room, placed in the supine position, and given general endotracheal anesthesia.  The patient was prepped and draped in the normal sterile fashion, and also received preoperative IV antibiotics.  An appropriate timeout was performed by the nursing staff prior to the incision.     Local anesthetic with Marcaine 0.5% was infused locally at all incision sites. An infraumbilical incision was then made to insert a 5 mm Optiview into the peritoneal cavity and the peritoneal cavity was insufflated with carbon dioxide. A separate 5 mm port was inserted in the subxiphoid region  along with a right subcostal 5 mm port.  The gallbladder was well visualized.    Good exposure was obtained, and the gallbladder was grasped at its infundibulum and its fundus and retracted superiorly and laterally.  There were some chronic attachments of fatty tissue to the gallbladder indicative of chronic cholecystitis and these were easily taken down.    Good exposure was obtained and dissection was performed at the junction of the cystic duct and the gallbladder. The cystic duct and cystic artery were identified in the normal manner.  A clip was then placed on the cystic duct proximally and then two clips were placed on the cystic artery proximally and one distally.  Cystic ductotomy was performed.  A separate cholangiogram catheter had been inserted through a right upper quadrant introducer port and then this was fed into the cystic duct itself and a clip  was applied.     Intraoperative cholangiography was then performed under fluoroscopy, carefully evaluating the biliary ductal anatomy.  This was done without difficulty.  No anatomic abnormality was noted.      The cholangiogram catheter was removed.  The cystic duct was clipped twice distally and then divided, as was the cystic artery.  Bovie electrocautery with L-hook was then used to remove the gallbladder from the liver bed.  It was then removed through the umbilical port site using an Endo Catch bag. Fascia was closed with a 2-O Vicryl figure of eight suture.    Copious irrigation was used in the patient’s abdominal cavity.  It was clean and dry at this point.  Hemostasis had been well controlled.  Hemostasis was intact and there was no evidence of bilious leakage. Trocars were removed under direct vision and the skin was closed with 4-0 PDS subcuticular stitch along with Steri-Strips for skin reapproximation.  There were no complications.    The patient was stable at this point and subsequently transferred back to the recovery room in stable condition.    Ollie Downey MD    3/14/2019    10:23 AM

## 2019-03-20 LAB
LAB AP CASE REPORT: NORMAL
PATH REPORT.FINAL DX SPEC: NORMAL

## 2019-03-21 ENCOUNTER — OFFICE VISIT (OUTPATIENT)
Dept: SURGERY | Facility: CLINIC | Age: 54
End: 2019-03-21

## 2019-03-21 VITALS
HEIGHT: 71 IN | SYSTOLIC BLOOD PRESSURE: 152 MMHG | TEMPERATURE: 98.4 F | HEART RATE: 102 BPM | OXYGEN SATURATION: 97 % | BODY MASS INDEX: 28.42 KG/M2 | WEIGHT: 203 LBS | DIASTOLIC BLOOD PRESSURE: 90 MMHG

## 2019-03-21 DIAGNOSIS — Z48.89 POSTOPERATIVE VISIT: Primary | ICD-10-CM

## 2019-03-21 PROCEDURE — 99024 POSTOP FOLLOW-UP VISIT: CPT | Performed by: SURGERY

## 2019-03-21 NOTE — PROGRESS NOTES
"Patient: James Morel    YOB: 1965    Date: 03/21/2019    Primary Care Provider: Arya Brewster MD    Reason for Consultation: Follow-up lap ligia    Chief Complaint   Patient presents with   • Post-op     PO lap cholecystectomy        History of present illness:  I saw the patient in the office today as a followup from their recent laparoscopic cholecystectomy.  They state that they have done well and are having no complaints.  Doing much better.    The following portions of the patient's history were reviewed and updated as appropriate: allergies, current medications, past family history, past medical history, past social history, past surgical history and problem list.      Vital Signs:   Vitals:    03/21/19 1405   BP: 152/90   Pulse: 102   Temp: 98.4 °F (36.9 °C)   TempSrc: Temporal   SpO2: 97%   Weight: 92.1 kg (203 lb)   Height: 180.3 cm (71\")       Physical Exam:   General Appearance:    Alert, cooperative, in no acute distress   Abdomen:     no masses, no organomegaly, soft non-tender, non-distended, no guarding, wounds are well healed     Assessment / Plan :    1. Postoperative visit        Patient is 1 week postoperative after a laparoscopic cholecystectomy.  Overall he is doing well.  Having no GI issues.  Activity instructions were given.  Follow-up as needed.    Electronically signed by Ollie Downey MD  03/21/19                "

## 2019-03-25 ENCOUNTER — OFFICE VISIT (OUTPATIENT)
Dept: INTERNAL MEDICINE | Facility: CLINIC | Age: 54
End: 2019-03-25

## 2019-03-25 VITALS
OXYGEN SATURATION: 97 % | TEMPERATURE: 98.6 F | DIASTOLIC BLOOD PRESSURE: 94 MMHG | HEART RATE: 98 BPM | HEIGHT: 71 IN | WEIGHT: 196.4 LBS | BODY MASS INDEX: 27.5 KG/M2 | SYSTOLIC BLOOD PRESSURE: 160 MMHG

## 2019-03-25 DIAGNOSIS — J44.1 COPD EXACERBATION (HCC): Primary | ICD-10-CM

## 2019-03-25 PROCEDURE — 99214 OFFICE O/P EST MOD 30 MIN: CPT | Performed by: INTERNAL MEDICINE

## 2019-03-25 PROCEDURE — 96372 THER/PROPH/DIAG INJ SC/IM: CPT | Performed by: INTERNAL MEDICINE

## 2019-03-25 RX ORDER — HYDROCHLOROTHIAZIDE 12.5 MG/1
12.5 TABLET ORAL DAILY
Qty: 30 TABLET | Refills: 1 | Status: SHIPPED | OUTPATIENT
Start: 2019-03-25 | End: 2019-06-07 | Stop reason: SDUPTHER

## 2019-03-25 RX ORDER — PREDNISONE 10 MG/1
TABLET ORAL
Qty: 48 EACH | Refills: 0 | Status: SHIPPED | OUTPATIENT
Start: 2019-03-25 | End: 2019-08-16

## 2019-03-25 RX ORDER — CITALOPRAM 10 MG/1
10 TABLET ORAL DAILY
Qty: 30 TABLET | Refills: 1 | Status: SHIPPED | OUTPATIENT
Start: 2019-03-25 | End: 2019-03-25

## 2019-03-25 RX ORDER — LEVOFLOXACIN 500 MG/1
500 TABLET, FILM COATED ORAL DAILY
Qty: 10 TABLET | Refills: 0 | Status: SHIPPED | OUTPATIENT
Start: 2019-03-25 | End: 2019-06-19

## 2019-03-25 RX ORDER — BENZONATATE 200 MG/1
200 CAPSULE ORAL 3 TIMES DAILY PRN
Qty: 40 CAPSULE | Refills: 1 | Status: SHIPPED | OUTPATIENT
Start: 2019-03-25 | End: 2019-06-19

## 2019-03-25 RX ORDER — SERTRALINE HYDROCHLORIDE 25 MG/1
25 TABLET, FILM COATED ORAL DAILY
Qty: 30 TABLET | Refills: 1 | Status: SHIPPED | OUTPATIENT
Start: 2019-03-25 | End: 2019-06-07 | Stop reason: SDUPTHER

## 2019-03-25 RX ORDER — METHYLPREDNISOLONE SODIUM SUCCINATE 125 MG/2ML
125 INJECTION, POWDER, LYOPHILIZED, FOR SOLUTION INTRAMUSCULAR; INTRAVENOUS EVERY 6 HOURS
Status: DISCONTINUED | OUTPATIENT
Start: 2019-03-25 | End: 2020-05-05

## 2019-03-25 RX ORDER — IPRATROPIUM BROMIDE AND ALBUTEROL SULFATE 2.5; .5 MG/3ML; MG/3ML
3 SOLUTION RESPIRATORY (INHALATION) EVERY 4 HOURS PRN
Qty: 360 ML | Refills: 1 | Status: SHIPPED | OUTPATIENT
Start: 2019-03-25

## 2019-03-25 RX ADMIN — METHYLPREDNISOLONE SODIUM SUCCINATE 125 MG: 125 INJECTION, POWDER, LYOPHILIZED, FOR SOLUTION INTRAMUSCULAR; INTRAVENOUS at 17:45

## 2019-03-25 NOTE — PROGRESS NOTES
"Subjective   James Morel is a 53 y.o. male.     Chief Complaint   Patient presents with   • Follow-up   • Shortness of Breath     Started last night, states it was like an asthma attack       History of Present Illness   Subjective:       James Morel is a 53 y.o. male here for evaluation of a cough.  The cough is without wheezing, dyspnea or hemoptysis, with wheezing, with shortness of breath, worsening over time and is aggravated by reclining position. Onset of symptoms was 2 days ago, gradually worsening since that time.  Associated symptoms include shortness of breath and wheezing. Patient does not have a history of asthma. Patient has not had recent travel. Patient does have a history of smoking. Patient  has had a previous chest x-ray. Patient has not had a PPD done.  The following portions of the patient's history were reviewed and updated as appropriate: allergies, current medications, past family history, past medical history, past social history, past surgical history and problem list.    Review of Systems  Pertinent items are noted in HPI.       Objective:      Oxygen saturation 95% on room air  /94   Pulse 98   Temp 98.6 °F (37 °C) (Temporal)   Ht 180.3 cm (71\")   Wt 89.1 kg (196 lb 6.4 oz)   SpO2 97%   BMI 27.39 kg/m²     General Appearance:    Alert, cooperative, no distress, appears stated age   Head:    Normocephalic, without obvious abnormality, atraumatic   Eyes:    PERRL, conjunctiva/corneas clear, EOM's intact, fundi     benign, both eyes        Ears:    Normal TM's and external ear canals, both ears   Nose:   Nares normal, septum midline, mucosa normal, no drainage    or sinus tenderness   Throat:   Lips, mucosa, and tongue normal; teeth and gums normal   Neck:   Supple, symmetrical, trachea midline, no adenopathy;        thyroid:  No enlargement/tenderness/nodules; no carotid    bruit or JVD   Back:     Symmetric, no curvature, ROM normal, no CVA tenderness   Lungs:     Clear " to auscultation bilaterally, respirations unlabored   Chest wall:    No tenderness or deformity   Heart:    Regular rate and rhythm, S1 and S2 normal, no murmur, rub   or gallop   Abdomen:     Soft, non-tender, bowel sounds active all four quadrants,     no masses, no organomegaly   Genitalia:    Normal male without lesion, discharge or tenderness   Rectal:    Normal tone, normal prostate, no masses or tenderness;    guaiac negative stool   Extremities:   Extremities normal, atraumatic, no cyanosis or edema   Pulses:   2+ and symmetric all extremities   Skin:   Skin color, texture, turgor normal, no rashes or lesions   Lymph nodes:   Cervical, supraclavicular, and axillary nodes normal   Neurologic:   CNII-XII intact. Normal strength, sensation and reflexes       throughout         Assessment:      Acute Bronchitis      Plan:      Explained lack of efficacy of antibiotics in viral disease.  Antitussives per medication orders.  Avoid exposure to tobacco smoke and fumes.  B-agonist inhaler.  Call if shortness of breath worsens, blood in sputum, change in character of cough, development of fever or chills, inability to maintain nutrition and hydration. Avoid exposure to tobacco smoke and fumes.     Current Outpatient Medications:   •  albuterol 108 (90 Base) MCG/ACT inhaler, Inhale 2 puffs Every 4 (Four) Hours As Needed for Wheezing or Shortness of Air., Disp: 2 inhaler, Rfl: 11  •  HYDROcodone-acetaminophen (NORCO) 5-325 MG per tablet, Take 1-2 tablets by mouth Every 4 (Four) Hours As Needed for Moderate Pain, Disp: 18 tablet, Rfl: 0  •  losartan (COZAAR) 100 MG tablet, Take 1 tablet by mouth Daily., Disp: 30 tablet, Rfl: 5  •  benzonatate (TESSALON) 200 MG capsule, Take 1 capsule by mouth 3 (Three) Times a Day As Needed for Cough., Disp: 40 capsule, Rfl: 1  •  ipratropium-albuterol (DUO-NEB) 0.5-2.5 mg/3 ml nebulizer, Take 3 mL by nebulization Every 4 (Four) Hours As Needed for Wheezing., Disp: 360 mL, Rfl: 1  •   levoFLOXacin (LEVAQUIN) 500 MG tablet, Take 1 tablet by mouth Daily., Disp: 10 tablet, Rfl: 0  •  predniSONE (DELTASONE) 10 MG (48) tablet pack, As instructed, Disp: 48 each, Rfl: 0    The following portions of the patient's history were reviewed and updated as appropriate: allergies, current medications, past family history, past medical history, past social history, past surgical history and problem list.    Review of Systems   Constitutional: Negative.    Respiratory: Positive for cough, shortness of breath and wheezing.    Cardiovascular: Negative.    Gastrointestinal: Negative.    Musculoskeletal: Negative.    Skin: Negative.    Neurological: Negative.    Psychiatric/Behavioral: Negative.        Objective   Physical Exam   Constitutional: He is oriented to person, place, and time. He appears well-developed and well-nourished.   Neck: Neck supple.   Cardiovascular: Normal rate, regular rhythm and normal heart sounds.   Pulmonary/Chest: Effort normal. He has wheezes. He has rales.   Abdominal: Bowel sounds are normal.   Neurological: He is alert and oriented to person, place, and time.   Skin: Skin is warm.   Psychiatric: He has a normal mood and affect.       All tests have been reviewed.    Assessment/Plan   Diagnoses and all orders for this visit:    COPD exacerbation (CMS/[facility], located within [facility])  -     XR Chest PA & Lateral    Other orders  -     levoFLOXacin (LEVAQUIN) 500 MG tablet; Take 1 tablet by mouth Daily.  -     predniSONE (DELTASONE) 10 MG (48) tablet pack; As instructed  -     benzonatate (TESSALON) 200 MG capsule; Take 1 capsule by mouth 3 (Three) Times a Day As Needed for Cough.  -     ipratropium-albuterol (DUO-NEB) 0.5-2.5 mg/3 ml nebulizer; Take 3 mL by nebulization Every 4 (Four) Hours As Needed for Wheezing.      Start solumedrol        etoh quit 3 after gall bladder surgery  Anxiety start celexa  RUQ abdominal pain resolved after gall bladder surgery  HTN high today, hctz start         To ER if worse  10  days

## 2019-04-03 ENCOUNTER — HOSPITAL ENCOUNTER (OUTPATIENT)
Dept: GENERAL RADIOLOGY | Facility: HOSPITAL | Age: 54
Discharge: HOME | End: 2019-04-03
Admitting: INTERNAL MEDICINE

## 2019-04-03 ENCOUNTER — OFFICE VISIT (OUTPATIENT)
Dept: INTERNAL MEDICINE | Facility: CLINIC | Age: 54
End: 2019-04-03

## 2019-04-03 VITALS
OXYGEN SATURATION: 96 % | HEIGHT: 71 IN | SYSTOLIC BLOOD PRESSURE: 130 MMHG | DIASTOLIC BLOOD PRESSURE: 82 MMHG | WEIGHT: 197.8 LBS | RESPIRATION RATE: 17 BRPM | TEMPERATURE: 97.8 F | BODY MASS INDEX: 27.69 KG/M2 | HEART RATE: 87 BPM

## 2019-04-03 DIAGNOSIS — I10 ESSENTIAL HYPERTENSION: ICD-10-CM

## 2019-04-03 DIAGNOSIS — F41.9 ANXIETY: Primary | ICD-10-CM

## 2019-04-03 DIAGNOSIS — R06.2 WHEEZING: ICD-10-CM

## 2019-04-03 PROCEDURE — 71046 X-RAY EXAM CHEST 2 VIEWS: CPT

## 2019-04-03 PROCEDURE — 99214 OFFICE O/P EST MOD 30 MIN: CPT | Performed by: INTERNAL MEDICINE

## 2019-04-03 RX ORDER — ALBUTEROL SULFATE 90 UG/1
2 AEROSOL, METERED RESPIRATORY (INHALATION) EVERY 4 HOURS PRN
Qty: 2 INHALER | Refills: 11 | Status: SHIPPED | OUTPATIENT
Start: 2019-04-03 | End: 2020-05-21

## 2019-04-03 NOTE — PROGRESS NOTES
Subjective   James Morel is a 53 y.o. male.     Chief Complaint   Patient presents with   • Follow-up       History of Present Illness   Patient here for follow-up of COPD exacerbation.  Patient states breathing wheezing cough somewhat improved after medications.  Patient here to do chest x-ray.  Patient is still smoking even though a few a day.  Anxiety improved on medication.  Blood pressure normalized after medication.  Patient here for annual physical. Patient also has multiple medical problems to be followed up. Below is a level III office visit note.  Some sleeping problems due to steroid intake    Current Outpatient Medications:   •  albuterol 108 (90 Base) MCG/ACT inhaler, Inhale 2 puffs Every 4 (Four) Hours As Needed for Wheezing or Shortness of Air., Disp: 2 inhaler, Rfl: 11  •  benzonatate (TESSALON) 200 MG capsule, Take 1 capsule by mouth 3 (Three) Times a Day As Needed for Cough., Disp: 40 capsule, Rfl: 1  •  hydrochlorothiazide (HYDRODIURIL) 12.5 MG tablet, Take 1 tablet by mouth Daily., Disp: 30 tablet, Rfl: 1  •  HYDROcodone-acetaminophen (NORCO) 5-325 MG per tablet, Take 1-2 tablets by mouth Every 4 (Four) Hours As Needed for Moderate Pain, Disp: 18 tablet, Rfl: 0  •  ipratropium-albuterol (DUO-NEB) 0.5-2.5 mg/3 ml nebulizer, Take 3 mL by nebulization Every 4 (Four) Hours As Needed for Wheezing., Disp: 360 mL, Rfl: 1  •  levoFLOXacin (LEVAQUIN) 500 MG tablet, Take 1 tablet by mouth Daily., Disp: 10 tablet, Rfl: 0  •  losartan (COZAAR) 100 MG tablet, Take 1 tablet by mouth Daily., Disp: 30 tablet, Rfl: 5  •  predniSONE (DELTASONE) 10 MG (48) tablet pack, As instructed, Disp: 48 each, Rfl: 0  •  sertraline (ZOLOFT) 25 MG tablet, Take 1 tablet by mouth Daily., Disp: 30 tablet, Rfl: 1    Current Facility-Administered Medications:   •  methylPREDNISolone sodium succinate (SOLU-Medrol) injection 125 mg, 125 mg, Intravenous, Q6H, Arya Brewster MD, 125 mg at 03/25/19 3411    The following portions of  the patient's history were reviewed and updated as appropriate: allergies, current medications, past family history, past medical history, past social history, past surgical history and problem list.    Review of Systems   Constitutional: Negative.    Respiratory: Positive for cough, shortness of breath and wheezing.    Cardiovascular: Negative.    Gastrointestinal: Negative.    Musculoskeletal: Negative.    Skin: Negative.    Neurological: Negative.    Psychiatric/Behavioral: Negative.        Objective   Physical Exam   Constitutional: He is oriented to person, place, and time. He appears well-developed and well-nourished.   Neck: Neck supple.   Cardiovascular: Normal rate, regular rhythm and normal heart sounds.   Pulmonary/Chest: Effort normal. He has wheezes. He has rales (rhonchi).   Abdominal: Bowel sounds are normal.   Neurological: He is alert and oriented to person, place, and time.   Skin: Skin is warm.   Psychiatric: He has a normal mood and affect.       All tests have been reviewed.    Assessment/Plan   There are no diagnoses linked to this encounter.          copd exac on abx and solumedrol improved pending CXR, start trelegy    etoh quit 3 after gall bladder surgery  tob to quit  Anxiety  continue  celexa  RUQ abdominal pain resolved after gall bladder surgery  HTN continue improved on medication  Insomnia start benadryl for now patient complains  1 mo

## 2019-04-04 DIAGNOSIS — R91.8 HILAR ENLARGEMENT: ICD-10-CM

## 2019-04-04 DIAGNOSIS — R91.1 LUNG NODULE: Primary | ICD-10-CM

## 2019-06-10 RX ORDER — CITALOPRAM 10 MG/1
TABLET ORAL
Qty: 30 TABLET | Refills: 0 | OUTPATIENT
Start: 2019-06-10

## 2019-06-10 RX ORDER — HYDROCHLOROTHIAZIDE 12.5 MG/1
TABLET ORAL
Qty: 30 TABLET | Refills: 0 | Status: SHIPPED | OUTPATIENT
Start: 2019-06-10 | End: 2019-08-03 | Stop reason: SDUPTHER

## 2019-06-10 RX ORDER — SERTRALINE HYDROCHLORIDE 25 MG/1
TABLET, FILM COATED ORAL
Qty: 30 TABLET | Refills: 11 | Status: SHIPPED | OUTPATIENT
Start: 2019-06-10 | End: 2019-09-19

## 2019-06-12 RX ORDER — SERTRALINE HYDROCHLORIDE 25 MG/1
25 TABLET, FILM COATED ORAL DAILY
Qty: 30 TABLET | Refills: 11 | Status: CANCELLED | OUTPATIENT
Start: 2019-06-12

## 2019-06-12 RX ORDER — LEVOFLOXACIN 500 MG/1
500 TABLET, FILM COATED ORAL DAILY
Qty: 10 TABLET | Refills: 0 | Status: CANCELLED | OUTPATIENT
Start: 2019-06-12

## 2019-06-12 RX ORDER — HYDROCHLOROTHIAZIDE 12.5 MG/1
12.5 TABLET ORAL DAILY
Qty: 30 TABLET | Refills: 0 | Status: CANCELLED | OUTPATIENT
Start: 2019-06-12

## 2019-06-19 RX ORDER — CITALOPRAM 10 MG/1
10 TABLET ORAL DAILY
Qty: 30 TABLET | Refills: 3 | Status: SHIPPED | OUTPATIENT
Start: 2019-06-19 | End: 2019-09-19 | Stop reason: SDUPTHER

## 2019-08-03 RX ORDER — HYDROCHLOROTHIAZIDE 12.5 MG/1
12.5 TABLET ORAL DAILY
Qty: 30 TABLET | Refills: 2 | Status: SHIPPED | OUTPATIENT
Start: 2019-08-03 | End: 2019-08-16 | Stop reason: SDUPTHER

## 2019-08-15 NOTE — TELEPHONE ENCOUNTER
Pts wife called stating pt has been having difficulty breathing for a while now but states it is worsening, pt refused to go to the ER, pt also refused to come in today to see Dr Brewster, scheduled him for tomorrow.

## 2019-08-16 ENCOUNTER — OFFICE VISIT (OUTPATIENT)
Dept: INTERNAL MEDICINE | Facility: CLINIC | Age: 54
End: 2019-08-16

## 2019-08-16 VITALS
HEIGHT: 71 IN | OXYGEN SATURATION: 94 % | HEART RATE: 70 BPM | TEMPERATURE: 98.1 F | WEIGHT: 180.8 LBS | BODY MASS INDEX: 25.31 KG/M2 | DIASTOLIC BLOOD PRESSURE: 86 MMHG | SYSTOLIC BLOOD PRESSURE: 132 MMHG

## 2019-08-16 DIAGNOSIS — J44.1 COPD EXACERBATION (HCC): Primary | ICD-10-CM

## 2019-08-16 PROBLEM — R06.2 WHEEZING: Status: RESOLVED | Noted: 2019-04-03 | Resolved: 2019-08-16

## 2019-08-16 PROCEDURE — 99214 OFFICE O/P EST MOD 30 MIN: CPT | Performed by: INTERNAL MEDICINE

## 2019-08-16 RX ORDER — PREDNISONE 10 MG/1
TABLET ORAL
Qty: 48 EACH | Refills: 0 | Status: SHIPPED | OUTPATIENT
Start: 2019-08-16 | End: 2019-09-06

## 2019-08-16 RX ORDER — HYDROCHLOROTHIAZIDE 12.5 MG/1
12.5 TABLET ORAL DAILY
Qty: 30 TABLET | Refills: 2 | Status: SHIPPED | OUTPATIENT
Start: 2019-08-16 | End: 2019-12-11 | Stop reason: SDUPTHER

## 2019-08-16 RX ORDER — LEVOFLOXACIN 500 MG/1
500 TABLET, FILM COATED ORAL DAILY
Qty: 10 TABLET | Refills: 0 | Status: SHIPPED | OUTPATIENT
Start: 2019-08-16 | End: 2019-09-06

## 2019-08-16 RX ORDER — BENZONATATE 200 MG/1
200 CAPSULE ORAL 3 TIMES DAILY PRN
Qty: 40 CAPSULE | Refills: 0 | Status: SHIPPED | OUTPATIENT
Start: 2019-08-16 | End: 2020-05-21

## 2019-08-16 NOTE — PROGRESS NOTES
Subjective   James Morel is a 53 y.o. male.     Chief Complaint   Patient presents with   • Shortness of Breath     X 2-3 weeks.    • Numbness       History of Present Illness   soa for 2 weeks cough neb helps, wheeze, yellow sputum, hx of copd and patient is using tob product. No fever chill. No sinus congestion , o2 sat 94%    Current Outpatient Medications:   •  albuterol sulfate  (90 Base) MCG/ACT inhaler, Inhale 2 puffs Every 4 (Four) Hours As Needed for Wheezing or Shortness of Air., Disp: 2 inhaler, Rfl: 11  •  citalopram (CELEXA) 10 MG tablet, Take 1 tablet by mouth Daily., Disp: 30 tablet, Rfl: 3  •  Fluticasone-Umeclidin-Vilant (TRELEGY ELLIPTA) 100-62.5-25 MCG/INH aerosol powder , Inhale 1 each Daily., Disp: 60 each, Rfl: 11  •  hydrochlorothiazide (HYDRODIURIL) 12.5 MG tablet, Take 1 tablet by mouth Daily., Disp: 30 tablet, Rfl: 2  •  HYDROcodone-acetaminophen (NORCO) 5-325 MG per tablet, Take 1-2 tablets by mouth Every 4 (Four) Hours As Needed for Moderate Pain, Disp: 18 tablet, Rfl: 0  •  ipratropium-albuterol (DUO-NEB) 0.5-2.5 mg/3 ml nebulizer, Take 3 mL by nebulization Every 4 (Four) Hours As Needed for Wheezing., Disp: 360 mL, Rfl: 1  •  losartan (COZAAR) 100 MG tablet, Take 1 tablet by mouth Daily., Disp: 30 tablet, Rfl: 5  •  predniSONE (DELTASONE) 10 MG (48) tablet pack, As instructed, Disp: 48 each, Rfl: 0  •  sertraline (ZOLOFT) 25 MG tablet, TAKE ONE TABLET BY MOUTH DAILY, Disp: 30 tablet, Rfl: 11  •  benzonatate (TESSALON) 200 MG capsule, Take 1 capsule by mouth 3 (Three) Times a Day As Needed for Cough., Disp: 40 capsule, Rfl: 0  •  levoFLOXacin (LEVAQUIN) 500 MG tablet, Take 1 tablet by mouth Daily., Disp: 10 tablet, Rfl: 0    Current Facility-Administered Medications:   •  methylPREDNISolone sodium succinate (SOLU-Medrol) injection 125 mg, 125 mg, Intravenous, Q6H, Arya Brewster MD, 125 mg at 03/25/19 5829    The following portions of the patient's history were reviewed and  updated as appropriate: allergies, current medications, past family history, past medical history, past social history, past surgical history and problem list.    Review of Systems   Constitutional: Negative.    Respiratory: Positive for cough, shortness of breath and wheezing.    Cardiovascular: Negative.    Gastrointestinal: Negative.    Musculoskeletal: Negative.    Skin: Negative.    Neurological: Negative.    Psychiatric/Behavioral: Negative.        Objective   Physical Exam   Constitutional: He is oriented to person, place, and time. He appears well-developed and well-nourished.   Neck: Neck supple.   Cardiovascular: Normal rate, regular rhythm and normal heart sounds.   Pulmonary/Chest: Effort normal. He has wheezes.   Abdominal: Bowel sounds are normal.   Neurological: He is alert and oriented to person, place, and time.   Skin: Skin is warm.   Psychiatric: He has a normal mood and affect.       All tests have been reviewed.    Assessment/Plan   Diagnoses and all orders for this visit:    COPD exacerbation (CMS/[facility])  -     XR Chest PA & Lateral    Other orders  -     hydrochlorothiazide (HYDRODIURIL) 12.5 MG tablet; Take 1 tablet by mouth Daily.  -     predniSONE (DELTASONE) 10 MG (48) tablet pack; As instructed  -     levoFLOXacin (LEVAQUIN) 500 MG tablet; Take 1 tablet by mouth Daily.  -     benzonatate (TESSALON) 200 MG capsule; Take 1 capsule by mouth 3 (Three) Times a Day As Needed for Cough.    10 day    To ER if worse

## 2019-08-27 ENCOUNTER — TELEPHONE (OUTPATIENT)
Dept: INTERNAL MEDICINE | Facility: CLINIC | Age: 54
End: 2019-08-27

## 2019-08-27 NOTE — TELEPHONE ENCOUNTER
Patient called in regards to his [facility] paperwork. He stated he brought them here to the office but his job has not received them yet. He would like to know the status of this paperwork and have it sent to his work place as soon as possible.

## 2019-09-06 ENCOUNTER — OFFICE VISIT (OUTPATIENT)
Dept: INTERNAL MEDICINE | Facility: CLINIC | Age: 54
End: 2019-09-06

## 2019-09-06 VITALS
BODY MASS INDEX: 25.87 KG/M2 | OXYGEN SATURATION: 97 % | RESPIRATION RATE: 18 BRPM | DIASTOLIC BLOOD PRESSURE: 74 MMHG | WEIGHT: 184.8 LBS | TEMPERATURE: 98.2 F | SYSTOLIC BLOOD PRESSURE: 126 MMHG | HEIGHT: 71 IN | HEART RATE: 86 BPM

## 2019-09-06 DIAGNOSIS — R49.0 HOARSENESS: ICD-10-CM

## 2019-09-06 DIAGNOSIS — J44.1 COPD EXACERBATION (HCC): Primary | ICD-10-CM

## 2019-09-06 PROCEDURE — 99214 OFFICE O/P EST MOD 30 MIN: CPT | Performed by: INTERNAL MEDICINE

## 2019-09-06 RX ORDER — BUDESONIDE AND FORMOTEROL FUMARATE DIHYDRATE 160; 4.5 UG/1; UG/1
2 AEROSOL RESPIRATORY (INHALATION)
Qty: 10.2 G | Refills: 12 | Status: SHIPPED | OUTPATIENT
Start: 2019-09-06 | End: 2020-09-22

## 2019-09-06 NOTE — PROGRESS NOTES
Subjective   James Morel is a 53 y.o. male.     Chief Complaint   Patient presents with   • Follow-up     11 day f/u for breathing.        History of Present Illness   Pt is a 53 yr old male with a PMH of COPD, HTN, and anxiety. The Pt presents today for an 11 day follow up for a COPD exacerbation. The Pt reports improved Sx after oral prednisone Levofloxacin Tx. The Pt states he feels as good as he did before the exacerbation but states he still has a mild cough and states he would like to return to work. The Pt denies fever, shortness of breath, chest pain, dizziness, and heart palpitations. The Pt states that he did not get his CT chest because insurance would not cover the imaging due to his recent CT abdomen in march for cholecystectomy. The Pt reports he was unable to get his Chest XR because there was no order for it at the time he went to get the imaging done.    The Pt states that he has had chronic hoarseness for 2 months. The Pt states that the hoarseness has not improved or worsened over this time period but has remained constant. The Pt denies allergies, congestion, sore throat, dysphagia, postnasal drip, fever, or shortness of air.     Current Outpatient Medications:   •  albuterol sulfate  (90 Base) MCG/ACT inhaler, Inhale 2 puffs Every 4 (Four) Hours As Needed for Wheezing or Shortness of Air., Disp: 2 inhaler, Rfl: 11  •  benzonatate (TESSALON) 200 MG capsule, Take 1 capsule by mouth 3 (Three) Times a Day As Needed for Cough., Disp: 40 capsule, Rfl: 0  •  citalopram (CELEXA) 10 MG tablet, Take 1 tablet by mouth Daily., Disp: 30 tablet, Rfl: 3  •  hydrochlorothiazide (HYDRODIURIL) 12.5 MG tablet, Take 1 tablet by mouth Daily., Disp: 30 tablet, Rfl: 2  •  HYDROcodone-acetaminophen (NORCO) 5-325 MG per tablet, Take 1-2 tablets by mouth Every 4 (Four) Hours As Needed for Moderate Pain, Disp: 18 tablet, Rfl: 0  •  ipratropium-albuterol (DUO-NEB) 0.5-2.5 mg/3 ml nebulizer, Take 3 mL by  nebulization Every 4 (Four) Hours As Needed for Wheezing., Disp: 360 mL, Rfl: 1  •  losartan (COZAAR) 100 MG tablet, Take 1 tablet by mouth Daily., Disp: 30 tablet, Rfl: 5  •  sertraline (ZOLOFT) 25 MG tablet, TAKE ONE TABLET BY MOUTH DAILY, Disp: 30 tablet, Rfl: 11  •  budesonide-formoterol (SYMBICORT) 160-4.5 MCG/ACT inhaler, Inhale 2 puffs 2 (Two) Times a Day., Disp: 10.2 g, Rfl: 12    Current Facility-Administered Medications:   •  methylPREDNISolone sodium succinate (SOLU-Medrol) injection 125 mg, 125 mg, Intravenous, Q6H, Arya Brewster MD, 125 mg at 03/25/19 1745    The following portions of the patient's history were reviewed and updated as appropriate: allergies, current medications, past family history, past medical history, past social history, past surgical history and problem list.    Review of Systems   Constitutional: Negative.  Negative for chills, diaphoresis, fatigue, fever and unexpected weight change.   HENT: Positive for voice change ( Hoarseness). Negative for congestion, dental problem, drooling, ear discharge, ear pain, facial swelling, hearing loss, mouth sores, nosebleeds, postnasal drip, rhinorrhea, sinus pressure, sinus pain, sneezing, sore throat, tinnitus and trouble swallowing.    Respiratory: Positive for cough. Negative for apnea, choking, chest tightness, shortness of breath, wheezing and stridor.    Cardiovascular: Negative.  Negative for chest pain, palpitations and leg swelling.   Gastrointestinal: Negative.    Skin: Negative.    Neurological: Negative for dizziness, syncope, weakness, light-headedness and headaches.   Psychiatric/Behavioral: Negative.        Objective   Physical Exam   Constitutional: He is oriented to person, place, and time. He appears well-developed and well-nourished.   HENT:   Head: Normocephalic and atraumatic.   Neck: Normal range of motion. Neck supple.   Cardiovascular: Normal rate, regular rhythm and normal heart sounds.   Pulmonary/Chest: Effort  normal and breath sounds normal. Stridor present. No respiratory distress. He has no wheezes. He has no rales. He exhibits no tenderness.   Abdominal: Soft. Bowel sounds are normal.   Neurological: He is alert and oriented to person, place, and time.   Psychiatric: He has a normal mood and affect. His behavior is normal.   Vitals reviewed.      All tests have been reviewed.    Assessment/Plan   Diagnoses and all orders for this visit:    COPD exacerbation (CMS/HCC) - Pt improved after prednisone and Abx Tx. Continue Duoneb PRN and albuterol PRN. - Pt instructed to start using Symbicort BID. Pt instructed to add CT chest to abdominal CT scheduled for next week ordered by Dr. Downey.    Hoarseness - Refer to ENT for evaluation.    Follow up in 1 month. Pt knows to call office or go to ED if COPD Sx worsen    Below is to historical records for reference only:  COPD exacerbation (CMS/HCC)  -     XR Chest PA & Lateral     Other orders  -     hydrochlorothiazide (HYDRODIURIL) 12.5 MG tablet; Take 1 tablet by mouth Daily.  -     predniSONE (DELTASONE) 10 MG (48) tablet pack; As instructed  -     levoFLOXacin (LEVAQUIN) 500 MG tablet; Take 1 tablet by mouth Daily.  -     benzonatate (TESSALON) 200 MG capsule; Take 1 capsule by mouth 3 (Three) Times a Day As Needed for Cough.

## 2019-09-06 NOTE — PROGRESS NOTES
Subjective   James Morel is a 53 y.o. male.     Chief Complaint   Patient presents with   • Follow-up     11 day f/u for breathing.        History of Present Illness   Pt is a 53 yr old male with a PMH of COPD, HTN, and anxiety who presents today for an 11 day follow up after a COPD exacerbation. The Pt states that he feels much better today then     Current Outpatient Medications:   •  albuterol sulfate  (90 Base) MCG/ACT inhaler, Inhale 2 puffs Every 4 (Four) Hours As Needed for Wheezing or Shortness of Air., Disp: 2 inhaler, Rfl: 11  •  benzonatate (TESSALON) 200 MG capsule, Take 1 capsule by mouth 3 (Three) Times a Day As Needed for Cough., Disp: 40 capsule, Rfl: 0  •  citalopram (CELEXA) 10 MG tablet, Take 1 tablet by mouth Daily., Disp: 30 tablet, Rfl: 3  •  hydrochlorothiazide (HYDRODIURIL) 12.5 MG tablet, Take 1 tablet by mouth Daily., Disp: 30 tablet, Rfl: 2  •  HYDROcodone-acetaminophen (NORCO) 5-325 MG per tablet, Take 1-2 tablets by mouth Every 4 (Four) Hours As Needed for Moderate Pain, Disp: 18 tablet, Rfl: 0  •  ipratropium-albuterol (DUO-NEB) 0.5-2.5 mg/3 ml nebulizer, Take 3 mL by nebulization Every 4 (Four) Hours As Needed for Wheezing., Disp: 360 mL, Rfl: 1  •  losartan (COZAAR) 100 MG tablet, Take 1 tablet by mouth Daily., Disp: 30 tablet, Rfl: 5  •  sertraline (ZOLOFT) 25 MG tablet, TAKE ONE TABLET BY MOUTH DAILY, Disp: 30 tablet, Rfl: 11    Current Facility-Administered Medications:   •  methylPREDNISolone sodium succinate (SOLU-Medrol) injection 125 mg, 125 mg, Intravenous, Q6H, Arya Brewster MD, 125 mg at 03/25/19 1745    The following portions of the patient's history were reviewed and updated as appropriate: allergies, current medications, past family history, past medical history, past social history, past surgical history and problem list.    Review of Systems    Objective   Physical Exam    All tests have been reviewed.    Assessment/Plan   Diagnoses and all orders for this  visit:    COPD exacerbation (CMS/HCC) - Pt improved after oral prednisone and Abx Tx. Continue Duoneb and albuterol PRN. Pt reports he did not get chest xray because there was no order for it when he went to get it.

## 2019-09-18 ENCOUNTER — TELEPHONE (OUTPATIENT)
Dept: INTERNAL MEDICINE | Facility: CLINIC | Age: 54
End: 2019-09-18

## 2019-09-18 DIAGNOSIS — I10 ESSENTIAL HYPERTENSION: ICD-10-CM

## 2019-09-18 RX ORDER — LOSARTAN POTASSIUM 100 MG/1
TABLET ORAL
Qty: 30 TABLET | Refills: 5 | Status: SHIPPED | OUTPATIENT
Start: 2019-09-18 | End: 2020-07-13

## 2019-09-18 NOTE — TELEPHONE ENCOUNTER
"Patient's wife called to follow up on a CT that she states was supposed to be ordered for the patient, but they have not heard anything back regarding the test.    Patient's last office note, on 9/6/19, states \"Pt instructed to add CT chest to abdominal CT scheduled for next week ordered by Dr. Downey.\"    However, a CT Chest was never ordered, and I do not see that an Abdominal CT was ordered/scheduled by Dr. Downey either.  Do you want to place orders for these tests?  Patient is coming in for a follow up appointment tomorrow.    "

## 2019-09-19 ENCOUNTER — OFFICE VISIT (OUTPATIENT)
Dept: INTERNAL MEDICINE | Facility: CLINIC | Age: 54
End: 2019-09-19

## 2019-09-19 VITALS
TEMPERATURE: 97.4 F | OXYGEN SATURATION: 98 % | SYSTOLIC BLOOD PRESSURE: 116 MMHG | HEIGHT: 71 IN | WEIGHT: 188 LBS | HEART RATE: 66 BPM | BODY MASS INDEX: 26.32 KG/M2 | RESPIRATION RATE: 18 BRPM | DIASTOLIC BLOOD PRESSURE: 75 MMHG

## 2019-09-19 DIAGNOSIS — F41.9 ANXIETY: ICD-10-CM

## 2019-09-19 DIAGNOSIS — J44.1 COPD EXACERBATION (HCC): ICD-10-CM

## 2019-09-19 DIAGNOSIS — I10 ESSENTIAL HYPERTENSION: Primary | ICD-10-CM

## 2019-09-19 PROCEDURE — 99214 OFFICE O/P EST MOD 30 MIN: CPT | Performed by: INTERNAL MEDICINE

## 2019-09-19 RX ORDER — CITALOPRAM 20 MG/1
20 TABLET ORAL DAILY
Qty: 30 TABLET | Refills: 1 | Status: SHIPPED | OUTPATIENT
Start: 2019-09-19 | End: 2019-12-11 | Stop reason: SDUPTHER

## 2019-09-19 NOTE — PROGRESS NOTES
Subjective   James Morel is a 53 y.o. male.     Chief Complaint   Patient presents with   • Anxiety     F/u on pt's anxiety and FMLA paperwork       History of Present Illness   Patient here for follow-up of.  The COPD improved after medications.  Anxiety getting worse patient is  taking Celexa 10 mg daily.  Blood pressure stable on medication.  Insomnia stable occasional Benadryl use.  Patient still smoking.lung nodule yet to do CT chest    Current Outpatient Medications:   •  albuterol sulfate  (90 Base) MCG/ACT inhaler, Inhale 2 puffs Every 4 (Four) Hours As Needed for Wheezing or Shortness of Air., Disp: 2 inhaler, Rfl: 11  •  benzonatate (TESSALON) 200 MG capsule, Take 1 capsule by mouth 3 (Three) Times a Day As Needed for Cough., Disp: 40 capsule, Rfl: 0  •  budesonide-formoterol (SYMBICORT) 160-4.5 MCG/ACT inhaler, Inhale 2 puffs 2 (Two) Times a Day., Disp: 10.2 g, Rfl: 12  •  citalopram (CELEXA) 10 MG tablet, Take 1 tablet by mouth Daily., Disp: 30 tablet, Rfl: 3  •  hydrochlorothiazide (HYDRODIURIL) 12.5 MG tablet, Take 1 tablet by mouth Daily., Disp: 30 tablet, Rfl: 2  •  HYDROcodone-acetaminophen (NORCO) 5-325 MG per tablet, Take 1-2 tablets by mouth Every 4 (Four) Hours As Needed for Moderate Pain, Disp: 18 tablet, Rfl: 0  •  ipratropium-albuterol (DUO-NEB) 0.5-2.5 mg/3 ml nebulizer, Take 3 mL by nebulization Every 4 (Four) Hours As Needed for Wheezing., Disp: 360 mL, Rfl: 1  •  losartan (COZAAR) 100 MG tablet, TAKE 1 TABLET EVERY DAY FOR BLOOD PRESSURE, Disp: 30 tablet, Rfl: 5  •  sertraline (ZOLOFT) 25 MG tablet, TAKE ONE TABLET BY MOUTH DAILY, Disp: 30 tablet, Rfl: 11    Current Facility-Administered Medications:   •  methylPREDNISolone sodium succinate (SOLU-Medrol) injection 125 mg, 125 mg, Intravenous, Q6H, Arya Brewster MD, 125 mg at 03/25/19 7515    The following portions of the patient's history were reviewed and updated as appropriate: allergies, current medications, past family  history, past medical history, past social history, past surgical history and problem list.    Review of Systems   Constitutional: Negative.    Respiratory: Negative.    Cardiovascular: Negative.    Gastrointestinal: Negative.    Musculoskeletal: Negative.    Skin: Negative.    Neurological: Negative.    Psychiatric/Behavioral: The patient is nervous/anxious.        Objective   Physical Exam   Constitutional: He is oriented to person, place, and time. He appears well-nourished.   Neck: Neck supple.   Cardiovascular: Normal rate, regular rhythm and normal heart sounds.   Pulmonary/Chest: Effort normal and breath sounds normal.   Abdominal: Bowel sounds are normal.   Neurological: He is alert and oriented to person, place, and time.   Skin: Skin is warm.   Psychiatric:   anxious       All tests have been reviewed.    Assessment/Plan   There are no diagnoses linked to this encounter.          copd exac on abx and solumedrol improved pending CXR, cont  trelegy    etoh quit 3 after gall bladder surgery  tob to quit  Anxiety  continue  celexa  RUQ abdominal pain resolved after gall bladder surgery  Lung nodule pending CT chest  HTN continue improved on medication  Insomnia  benadryl prn  3 weeks

## 2019-09-27 ENCOUNTER — HOSPITAL ENCOUNTER (OUTPATIENT)
Dept: CARDIOLOGY | Facility: HOSPITAL | Age: 54
Discharge: HOME | End: 2019-09-27
Admitting: OTOLARYNGOLOGY

## 2019-09-27 ENCOUNTER — TRANSCRIBE ORDERS (OUTPATIENT)
Dept: ADMINISTRATIVE | Facility: HOSPITAL | Age: 54
End: 2019-09-27

## 2019-09-27 ENCOUNTER — APPOINTMENT (OUTPATIENT)
Dept: LAB | Facility: HOSPITAL | Age: 54
End: 2019-09-27

## 2019-09-27 DIAGNOSIS — Z01.812 PRE-OPERATIVE LABORATORY EXAMINATION: Primary | ICD-10-CM

## 2019-09-27 LAB
ALBUMIN SERPL-MCNC: 4.2 G/DL (ref 3.5–5.2)
ALBUMIN/GLOB SERPL: 1.6 G/DL
ALP SERPL-CCNC: 45 U/L (ref 39–117)
ALT SERPL W P-5'-P-CCNC: 16 U/L (ref 1–41)
ANION GAP SERPL CALCULATED.3IONS-SCNC: 11.5 MMOL/L (ref 5–15)
AST SERPL-CCNC: 15 U/L (ref 1–40)
BASOPHILS # BLD AUTO: 0.08 10*3/MM3 (ref 0–0.2)
BASOPHILS NFR BLD AUTO: 0.8 % (ref 0–1.5)
BILIRUB SERPL-MCNC: 0.5 MG/DL (ref 0.2–1.2)
BUN BLD-MCNC: 16 MG/DL (ref 6–20)
BUN/CREAT SERPL: 18.8 (ref 7–25)
CALCIUM SPEC-SCNC: 9.4 MG/DL (ref 8.6–10.5)
CHLORIDE SERPL-SCNC: 98 MMOL/L (ref 98–107)
CO2 SERPL-SCNC: 28.5 MMOL/L (ref 22–29)
CREAT BLD-MCNC: 0.85 MG/DL (ref 0.76–1.27)
DEPRECATED RDW RBC AUTO: 44 FL (ref 37–54)
EOSINOPHIL # BLD AUTO: 0.14 10*3/MM3 (ref 0–0.4)
EOSINOPHIL NFR BLD AUTO: 1.5 % (ref 0.3–6.2)
ERYTHROCYTE [DISTWIDTH] IN BLOOD BY AUTOMATED COUNT: 12.4 % (ref 12.3–15.4)
GFR SERPL CREATININE-BSD FRML MDRD: 94 ML/MIN/1.73
GLOBULIN UR ELPH-MCNC: 2.7 GM/DL
GLUCOSE BLD-MCNC: 111 MG/DL (ref 65–99)
HCT VFR BLD AUTO: 43.3 % (ref 37.5–51)
HGB BLD-MCNC: 14.2 G/DL (ref 13–17.7)
IMM GRANULOCYTES # BLD AUTO: 0.02 10*3/MM3 (ref 0–0.05)
IMM GRANULOCYTES NFR BLD AUTO: 0.2 % (ref 0–0.5)
LYMPHOCYTES # BLD AUTO: 1.83 10*3/MM3 (ref 0.7–3.1)
LYMPHOCYTES NFR BLD AUTO: 19.4 % (ref 19.6–45.3)
MCH RBC QN AUTO: 31.5 PG (ref 26.6–33)
MCHC RBC AUTO-ENTMCNC: 32.8 G/DL (ref 31.5–35.7)
MCV RBC AUTO: 96 FL (ref 79–97)
MONOCYTES # BLD AUTO: 0.88 10*3/MM3 (ref 0.1–0.9)
MONOCYTES NFR BLD AUTO: 9.3 % (ref 5–12)
NEUTROPHILS # BLD AUTO: 6.49 10*3/MM3 (ref 1.7–7)
NEUTROPHILS NFR BLD AUTO: 68.8 % (ref 42.7–76)
NRBC BLD AUTO-RTO: 0 /100 WBC (ref 0–0.2)
PLATELET # BLD AUTO: 340 10*3/MM3 (ref 140–450)
PMV BLD AUTO: 10.3 FL (ref 6–12)
POTASSIUM BLD-SCNC: 3.5 MMOL/L (ref 3.5–5.2)
PROT SERPL-MCNC: 6.9 G/DL (ref 6–8.5)
RBC # BLD AUTO: 4.51 10*6/MM3 (ref 4.14–5.8)
SODIUM BLD-SCNC: 138 MMOL/L (ref 136–145)
WBC NRBC COR # BLD: 9.44 10*3/MM3 (ref 3.4–10.8)

## 2019-09-27 PROCEDURE — 36415 COLL VENOUS BLD VENIPUNCTURE: CPT | Performed by: OTOLARYNGOLOGY

## 2019-09-27 PROCEDURE — 80053 COMPREHEN METABOLIC PANEL: CPT | Performed by: OTOLARYNGOLOGY

## 2019-09-27 PROCEDURE — 93005 ELECTROCARDIOGRAM TRACING: CPT

## 2019-09-27 PROCEDURE — 85025 COMPLETE CBC W/AUTO DIFF WBC: CPT | Performed by: OTOLARYNGOLOGY

## 2019-09-27 PROCEDURE — 93010 ELECTROCARDIOGRAM REPORT: CPT | Performed by: INTERNAL MEDICINE

## 2019-10-01 ENCOUNTER — LAB REQUISITION (OUTPATIENT)
Dept: LAB | Facility: HOSPITAL | Age: 54
End: 2019-10-01

## 2019-10-01 DIAGNOSIS — R49.0 DYSPHONIA: ICD-10-CM

## 2019-10-01 DIAGNOSIS — J39.0 RETROPHARYNGEAL AND PARAPHARYNGEAL ABSCESS: ICD-10-CM

## 2019-10-01 PROCEDURE — 88305 TISSUE EXAM BY PATHOLOGIST: CPT | Performed by: OTOLARYNGOLOGY

## 2019-11-19 LAB
CYTO UR: NORMAL
LAB AP CASE REPORT: NORMAL
LAB AP CLINICAL INFORMATION: NORMAL
LAB AP DIAGNOSIS COMMENT: NORMAL
PATH REPORT.ADDENDUM SPEC: NORMAL
PATH REPORT.FINAL DX SPEC: NORMAL
PATH REPORT.GROSS SPEC: NORMAL

## 2019-12-05 ENCOUNTER — TELEPHONE (OUTPATIENT)
Dept: SURGERY | Facility: CLINIC | Age: 54
End: 2019-12-05

## 2019-12-05 ENCOUNTER — DOCUMENTATION (OUTPATIENT)
Dept: SURGERY | Facility: CLINIC | Age: 54
End: 2019-12-05

## 2019-12-05 NOTE — TELEPHONE ENCOUNTER
Left message. Patient needs to have a CT of the abdomen and pelvis per Dr. Downey.  He was due in September for a 6 month repeat CT. If patient has had a CT somewhere besides  please find out where and patient can disregard this message.

## 2019-12-11 RX ORDER — CITALOPRAM 20 MG/1
TABLET ORAL
Qty: 30 TABLET | Refills: 1 | Status: SHIPPED | OUTPATIENT
Start: 2019-12-11 | End: 2020-05-05

## 2019-12-11 RX ORDER — HYDROCHLOROTHIAZIDE 12.5 MG/1
TABLET ORAL
Qty: 30 TABLET | Refills: 2 | Status: SHIPPED | OUTPATIENT
Start: 2019-12-11 | End: 2020-05-07

## 2020-05-04 ENCOUNTER — TELEPHONE (OUTPATIENT)
Dept: INTERNAL MEDICINE | Facility: CLINIC | Age: 55
End: 2020-05-04

## 2020-05-04 NOTE — TELEPHONE ENCOUNTER
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

## 2020-05-05 ENCOUNTER — TELEMEDICINE (OUTPATIENT)
Dept: INTERNAL MEDICINE | Facility: CLINIC | Age: 55
End: 2020-05-05

## 2020-05-05 DIAGNOSIS — I10 ESSENTIAL HYPERTENSION: Primary | ICD-10-CM

## 2020-05-05 DIAGNOSIS — Z72.0 TOBACCO ABUSE: ICD-10-CM

## 2020-05-05 DIAGNOSIS — R91.1 LUNG NODULE: ICD-10-CM

## 2020-05-05 DIAGNOSIS — K86.9 PANCREATIC LESION: ICD-10-CM

## 2020-05-05 DIAGNOSIS — F41.9 ANXIETY: ICD-10-CM

## 2020-05-05 DIAGNOSIS — G47.00 INSOMNIA, UNSPECIFIED TYPE: ICD-10-CM

## 2020-05-05 DIAGNOSIS — J44.9 CHRONIC OBSTRUCTIVE PULMONARY DISEASE, UNSPECIFIED COPD TYPE (HCC): ICD-10-CM

## 2020-05-05 PROBLEM — R49.0 HOARSENESS: Status: RESOLVED | Noted: 2019-09-06 | Resolved: 2020-05-05

## 2020-05-05 PROCEDURE — 99214 OFFICE O/P EST MOD 30 MIN: CPT | Performed by: INTERNAL MEDICINE

## 2020-05-05 NOTE — PROGRESS NOTES
Subjective   Johnathan Morel is a 54 y.o. male.     Chief Complaint   Patient presents with   • Anxiety     discuss medications        History of Present Illness   Patient complaints unable to sleep.  Usually sleeps 2 hours then wake up.  Patient is a third shift worker.  Also complains nervous anxious due to the pandemic.  COPD stable patient still smokes.  Denies any abdominal pain.  Lung nodule patient yet to do CT scan for follow-up.  Pancreatic lesion patient canceled appointment in the past for test and blood pressure stable on medication per patient.    Current Outpatient Medications:   •  albuterol sulfate  (90 Base) MCG/ACT inhaler, Inhale 2 puffs Every 4 (Four) Hours As Needed for Wheezing or Shortness of Air., Disp: 2 inhaler, Rfl: 11  •  benzonatate (TESSALON) 200 MG capsule, Take 1 capsule by mouth 3 (Three) Times a Day As Needed for Cough., Disp: 40 capsule, Rfl: 0  •  budesonide-formoterol (SYMBICORT) 160-4.5 MCG/ACT inhaler, Inhale 2 puffs 2 (Two) Times a Day., Disp: 10.2 g, Rfl: 12  •  citalopram (CeleXA) 20 MG tablet, TAKE 1 TABLET EVERY DAY FOR MOOD AND/OR ANXIETY, Disp: 30 tablet, Rfl: 1  •  hydroCHLOROthiazide (HYDRODIURIL) 12.5 MG tablet, TAKE 1 TABLET EVERY DAY FOR BLOOD PRESSURE AND/OR FLUID, Disp: 30 tablet, Rfl: 2  •  HYDROcodone-acetaminophen (NORCO) 5-325 MG per tablet, Take 1-2 tablets by mouth Every 4 (Four) Hours As Needed for Moderate Pain, Disp: 18 tablet, Rfl: 0  •  HYDROcodone-acetaminophen (NORCO) 7.5-325 MG per tablet, Take 1 tablet by mouth Every 6 (Six) Hours As Needed for pain, Disp: 12 tablet, Rfl: 0  •  ipratropium-albuterol (DUO-NEB) 0.5-2.5 mg/3 ml nebulizer, Take 3 mL by nebulization Every 4 (Four) Hours As Needed for Wheezing., Disp: 360 mL, Rfl: 1  •  losartan (COZAAR) 100 MG tablet, TAKE 1 TABLET EVERY DAY FOR BLOOD PRESSURE, Disp: 30 tablet, Rfl: 5  •  ondansetron ODT (ZOFRAN-ODT) 4 MG disintegrating tablet, Place 1 tablet on the tongue Every 4 (Four) Hours  As Needed. Max 3 doses in 24 hours, Disp: 10 tablet, Rfl: 1    Current Facility-Administered Medications:   •  methylPREDNISolone sodium succinate (SOLU-Medrol) injection 125 mg, 125 mg, Intravenous, Q6H, Arya Brewster MD, 125 mg at 03/25/19 1745    The following portions of the patient's history were reviewed and updated as appropriate: allergies, current medications, past family history, past medical history, past social history, past surgical history and problem list.    Review of Systems   Constitutional: Negative.    Respiratory: Negative.    Cardiovascular: Negative.    Gastrointestinal: Negative.    Musculoskeletal: Negative.    Skin: Negative.    Neurological: Negative.    Psychiatric/Behavioral: Positive for agitation and sleep disturbance. The patient is nervous/anxious.        Objective   Physical Exam   Constitutional: He is oriented to person, place, and time. He appears well-developed and well-nourished.   Neck: Normal range of motion.   Neurological: He is alert and oriented to person, place, and time.   Psychiatric: He has a normal mood and affect.       All tests have been reviewed.    Assessment/Plan   There are no diagnoses linked to this encounter.     copd stable on medication    etoh quit 3 after gall bladder surgery  tob to quit  Anxiety self discontinued Celexa initiate Remeron  RUQ abdominal pain resolved after gall bladder surgery  Pancreatic lesion per surgeon schedule CT scan  Lung nodule pending CT chest  HTN continue improved on medication  Insomnia  try Remeron 15 mg before going to bed  2 weeks follow-up     You have chosen to receive care through a telehealth visit.  Do you consent to use a video/audio connection for your medical care today? YES

## 2020-05-06 ENCOUNTER — TELEPHONE (OUTPATIENT)
Dept: INTERNAL MEDICINE | Facility: CLINIC | Age: 55
End: 2020-05-06

## 2020-05-06 NOTE — TELEPHONE ENCOUNTER
PTS WIFE CALLED STATING PTS PHARMACY HAS NOT RECEIVED SCRIPT FOR AMBIEN    PLEASE ADVISE AT:[phone]       [facility] [place] - [address] - [phone]  - [phone] FX

## 2020-05-07 RX ORDER — HYDROCHLOROTHIAZIDE 12.5 MG/1
TABLET ORAL
Qty: 30 TABLET | Refills: 2 | Status: SHIPPED | OUTPATIENT
Start: 2020-05-07 | End: 2020-08-13

## 2020-05-07 RX ORDER — MIRTAZAPINE 15 MG/1
15 TABLET, FILM COATED ORAL NIGHTLY
Qty: 30 TABLET | Refills: 1 | Status: SHIPPED | OUTPATIENT
Start: 2020-05-07 | End: 2020-05-21

## 2020-05-07 RX ORDER — CITALOPRAM 20 MG/1
TABLET ORAL
Qty: 30 TABLET | Refills: 2 | Status: SHIPPED | OUTPATIENT
Start: 2020-05-07 | End: 2020-05-21

## 2020-05-13 ENCOUNTER — HOSPITAL ENCOUNTER (OUTPATIENT)
Dept: CT IMAGING | Facility: HOSPITAL | Age: 55
Discharge: HOME | End: 2020-05-13
Admitting: INTERNAL MEDICINE

## 2020-05-13 ENCOUNTER — HOSPITAL ENCOUNTER (OUTPATIENT)
Dept: CT IMAGING | Facility: HOSPITAL | Age: 55
Discharge: HOME | End: 2020-05-13

## 2020-05-13 LAB — CREAT BLDA-MCNC: 0.9 MG/DL (ref 0.6–1.3)

## 2020-05-13 PROCEDURE — 71260 CT THORAX DX C+: CPT

## 2020-05-13 PROCEDURE — 82565 ASSAY OF CREATININE: CPT

## 2020-05-13 PROCEDURE — 25010000002 IOPAMIDOL 61 % SOLUTION: Performed by: INTERNAL MEDICINE

## 2020-05-13 PROCEDURE — 74178 CT ABD&PLV WO CNTR FLWD CNTR: CPT

## 2020-05-13 RX ADMIN — IOPAMIDOL 100 ML: 612 INJECTION, SOLUTION INTRAVENOUS at 15:28

## 2020-05-21 ENCOUNTER — TELEPHONE (OUTPATIENT)
Dept: INTERNAL MEDICINE | Facility: CLINIC | Age: 55
End: 2020-05-21

## 2020-05-21 ENCOUNTER — OFFICE VISIT (OUTPATIENT)
Dept: INTERNAL MEDICINE | Facility: CLINIC | Age: 55
End: 2020-05-21

## 2020-05-21 VITALS
HEIGHT: 71 IN | DIASTOLIC BLOOD PRESSURE: 66 MMHG | TEMPERATURE: 98.4 F | OXYGEN SATURATION: 98 % | SYSTOLIC BLOOD PRESSURE: 107 MMHG | BODY MASS INDEX: 29.37 KG/M2 | HEART RATE: 109 BPM | WEIGHT: 209.8 LBS

## 2020-05-21 DIAGNOSIS — J44.9 CHRONIC OBSTRUCTIVE PULMONARY DISEASE, UNSPECIFIED COPD TYPE (HCC): ICD-10-CM

## 2020-05-21 DIAGNOSIS — N40.1 BENIGN PROSTATIC HYPERPLASIA WITH LOWER URINARY TRACT SYMPTOMS, SYMPTOM DETAILS UNSPECIFIED: ICD-10-CM

## 2020-05-21 DIAGNOSIS — G47.00 INSOMNIA, UNSPECIFIED TYPE: ICD-10-CM

## 2020-05-21 DIAGNOSIS — F41.9 ANXIETY: ICD-10-CM

## 2020-05-21 DIAGNOSIS — I10 ESSENTIAL HYPERTENSION: Primary | ICD-10-CM

## 2020-05-21 DIAGNOSIS — K86.9 PANCREATIC LESION: ICD-10-CM

## 2020-05-21 DIAGNOSIS — Z72.0 TOBACCO ABUSE: ICD-10-CM

## 2020-05-21 DIAGNOSIS — R91.1 LUNG NODULE: ICD-10-CM

## 2020-05-21 PROCEDURE — 99214 OFFICE O/P EST MOD 30 MIN: CPT | Performed by: INTERNAL MEDICINE

## 2020-05-21 RX ORDER — BUPROPION HYDROCHLORIDE 150 MG/1
150 TABLET ORAL DAILY
Qty: 30 TABLET | Refills: 1 | Status: SHIPPED | OUTPATIENT
Start: 2020-05-21 | End: 2020-08-21

## 2020-05-21 NOTE — TELEPHONE ENCOUNTER
Left detailed voice mail requesting a call back. Dr. Brewster is hoping he can come in today for an appointment

## 2020-05-21 NOTE — PROGRESS NOTES
Subjective   Johnathan Morel is a 54 y.o. male.     Chief Complaint   Patient presents with   • Hypertension     discuss medication / weight gain        History of Present Illness   Patient here for follow-up of.  The weight gain about 20 pounds recently.  Patient states her Remeron caused weight gain.  Also Remeron caused too much his sleepiness and throat closing down.  Patient is not taking every other day.  Blood pressure stable now.  COPD stable.  The lung nodule on CT scan.  Pancreatic lesion thought to be possibly a cyst.  Anxiety Celexa caused erectile dysfunction.  Still smokes.    Current Outpatient Medications:   •  budesonide-formoterol (SYMBICORT) 160-4.5 MCG/ACT inhaler, Inhale 2 puffs 2 (Two) Times a Day., Disp: 10.2 g, Rfl: 12  •  hydroCHLOROthiazide (HYDRODIURIL) 12.5 MG tablet, TAKE 1 TABLET EVERY DAY FOR BLOOD PRESSURE AND/OR FLUID, Disp: 30 tablet, Rfl: 2  •  ipratropium-albuterol (DUO-NEB) 0.5-2.5 mg/3 ml nebulizer, Take 3 mL by nebulization Every 4 (Four) Hours As Needed for Wheezing., Disp: 360 mL, Rfl: 1  •  losartan (COZAAR) 100 MG tablet, TAKE 1 TABLET EVERY DAY FOR BLOOD PRESSURE, Disp: 30 tablet, Rfl: 5  •  buPROPion XL (Wellbutrin XL) 150 MG 24 hr tablet, Take 1 tablet by mouth Daily., Disp: 30 tablet, Rfl: 1    The following portions of the patient's history were reviewed and updated as appropriate: allergies, current medications, past family history, past medical history, past social history, past surgical history and problem list.    Review of Systems   Constitutional: Negative.    Respiratory: Negative.    Cardiovascular: Negative.    Gastrointestinal: Negative.    Musculoskeletal: Negative.    Skin: Negative.    Neurological: Negative.    Psychiatric/Behavioral: Negative.        Objective   Physical Exam   Constitutional: He is oriented to person, place, and time. He appears well-nourished.   Neck: Neck supple.   Cardiovascular: Normal rate, regular rhythm and normal heart sounds.    Pulmonary/Chest: Effort normal and breath sounds normal.   Abdominal: Bowel sounds are normal.   Neurological: He is alert and oriented to person, place, and time.   Skin: Skin is warm.   Psychiatric: He has a normal mood and affect.       All tests have been reviewed.    Assessment/Plan   Diagnoses and all orders for this visit:    Essential hypertension continue medication  -     CBC & Differential  -     Comprehensive Metabolic Panel  -     Lipid Panel  -     TSH    Chronic obstructive pulmonary disease, unspecified COPD type (CMS/HCC) continue medication    Lung nodule  -     NM PET Skull Base To Mid Thigh    Pancreatic lesion repeat in 6 months for CT scan    Anxiety Remeron caused her weight gain gradually and throat closing down, Celexa caused erectile dysfunction.  We will try Wellbutrin  -     buPROPion XL (Wellbutrin XL) 150 MG 24 hr tablet; Take 1 tablet by mouth Daily.    Insomnia, unspecified type Benadryl as needed  -     Hemoglobin A1c    Tobacco abuse to quit    Benign prostatic hyperplasia with lower urinary tract symptoms, symptom details unspecified  -     PSA DIAGNOSTIC    1 month for physical after blood tests and also follow-up at that time           copd stable on medication    etoh quit 3 after gall bladder surgery  tob to quit  Anxiety self discontinued Celexa initiate Remeron  RUQ abdominal pain resolved after gall bladder surgery  Pancreatic lesion per surgeon repeat NSC on CT scan, radiology rec follow up in 6 mo  Small fat-containing  right greater the left inguinal hernia  Lung nodule on CT chest schedule pet scan  HTN continue improved on medication  Insomnia  try Remeron 15 mg before going to bed

## 2020-06-08 ENCOUNTER — TELEPHONE (OUTPATIENT)
Dept: INTERNAL MEDICINE | Facility: CLINIC | Age: 55
End: 2020-06-08

## 2020-06-08 NOTE — TELEPHONE ENCOUNTER
PT'S WIFE, AUDRA, CALLED STATED THAT THEY RECEIVED A LETTER STATING THAT THE DR WANTED THE PT TO HAVE A PET SCAN.  SHE STATED THAT THE PT JUST HAD A MRI A FEW WEEKS AGO.    AUDRA'S CONTACT [phone]   WIFE WOULD LIKE A CALL BACK    PLEASE ADVISE

## 2020-06-10 NOTE — TELEPHONE ENCOUNTER
Called the patient answered the questions. patient is going to schedule a PET scan.  And to see me 3 days after the PET scan, PHUONG

## 2020-07-11 DIAGNOSIS — I10 ESSENTIAL HYPERTENSION: ICD-10-CM

## 2020-07-13 RX ORDER — LOSARTAN POTASSIUM 100 MG/1
TABLET ORAL
Qty: 30 TABLET | Refills: 5 | Status: SHIPPED | OUTPATIENT
Start: 2020-07-13 | End: 2021-04-15

## 2020-08-13 RX ORDER — HYDROCHLOROTHIAZIDE 12.5 MG/1
TABLET ORAL
Qty: 30 TABLET | Refills: 2 | Status: SHIPPED | OUTPATIENT
Start: 2020-08-13 | End: 2021-01-26

## 2020-08-21 ENCOUNTER — OFFICE VISIT (OUTPATIENT)
Dept: INTERNAL MEDICINE | Facility: CLINIC | Age: 55
End: 2020-08-21

## 2020-08-21 VITALS
WEIGHT: 207 LBS | HEIGHT: 71 IN | BODY MASS INDEX: 28.98 KG/M2 | TEMPERATURE: 97.7 F | HEART RATE: 85 BPM | SYSTOLIC BLOOD PRESSURE: 120 MMHG | OXYGEN SATURATION: 98 % | DIASTOLIC BLOOD PRESSURE: 86 MMHG

## 2020-08-21 DIAGNOSIS — R45.7 EMOTIONAL STRESS: ICD-10-CM

## 2020-08-21 DIAGNOSIS — F41.9 ANXIETY: Primary | ICD-10-CM

## 2020-08-21 DIAGNOSIS — I10 ESSENTIAL HYPERTENSION: ICD-10-CM

## 2020-08-21 PROCEDURE — 99214 OFFICE O/P EST MOD 30 MIN: CPT | Performed by: NURSE PRACTITIONER

## 2020-08-21 RX ORDER — BUSPIRONE HYDROCHLORIDE 30 MG/1
30 TABLET ORAL 3 TIMES DAILY PRN
Qty: 90 TABLET | Refills: 1 | Status: SHIPPED | OUTPATIENT
Start: 2020-08-21 | End: 2020-12-10

## 2020-08-21 NOTE — PROGRESS NOTES
"Date: 2020    Name: Johnathan Morel  : 1965    Chief Complaint:   Chief Complaint   Patient presents with   • Anxiety     pt states his nerves has been tore up, pt states he is having trouble working and eating and starts crying randomly        HPI:  Johnathan Morel is a 54 y.o. male presents with reports of crying jags at work, home and in public.  Reports he has had increased emotional stress over the past year, daughter has recently been convicted of a crime and will be in [facility] for 2 years.  He is not eating well, trouble working due to not being able to concentrate and feeling emotional.  Repeats several times he needs \"nerve pills\".  Crying during exam, off and on.  Denies SI, HI, alcohol or drug use.  He has taken buspar 5 mg prn anxiety, without a little relief of anxiety.    Blood pressure is elevated today.  He takes hctz, losartan as prescribed.  Denies chest pain, dyspnea, orthopnea, palpitations, lower extremity edema, confusion, headaches, weakness, visual disturbances.    History:  The following portions of the patient's history were reviewed and updated as appropriate: allergies, current medications, past medical history, family history, surgical history, social history and problem list.     ROS:  Review of Systems   Constitutional: Negative.    Respiratory: Negative.    Cardiovascular: Negative.    Psychiatric/Behavioral: Positive for agitation and sleep disturbance. Negative for hallucinations and negative for hyperactivity.       VS:  Vitals:    20 1612   BP: 120/86   Pulse: 85   Temp: 97.7 °F (36.5 °C)   TempSrc: Temporal   SpO2: 98%   Weight: 93.9 kg (207 lb)   Height: 180.3 cm (70.98\")     Body mass index is 28.89 kg/m².    PE:  Physical Exam   Constitutional: He is oriented to person, place, and time. He appears well-developed and well-nourished. No distress.   HENT:   Head: Normocephalic.   Right Ear: External ear normal.   Left Ear: External ear normal.   Eyes: Pupils " are equal, round, and reactive to light. Conjunctivae are normal.   Cardiovascular: Normal rate, regular rhythm, normal heart sounds and intact distal pulses.   Pulmonary/Chest: Effort normal and breath sounds normal.   Neurological: He is alert and oriented to person, place, and time.   Skin: Skin is warm. Capillary refill takes less than 2 seconds.   Psychiatric: His speech is normal. Thought content normal. His mood appears anxious. He is agitated.       Assessment/Plan:  Johnathan was seen today for anxiety.    Diagnoses and all orders for this visit:    Anxiety  -     busPIRone (BUSPAR) 30 MG tablet; Take 1 tablet by mouth 3 (Three) Times a Day As Needed (anxiety). Increased dose from 5 mg.    Emotional stress        -  Refused referral to Behavioral Health at this time        - Encouraged to take part in daily physical exercise.          - Eat healthy, well balanced diet; avoid sugary foods or beverages        - Limit alcohol intake        - Ensure good night's sleep by creating calm space in bedroom, avoiding screen time 1-2 hours before bed, no caffeine after 5 pm        - Talk to supportive family and friends, as needed        - Advised to engage in self care activities he enjoys.  Essential hypertension        - Follow heart healthy diet.  Advised to reduce daily sodium intake to < 1500 mg per day.  Avoid processed & fast foods.        - Exercise as tolerated, with a goal of 30 minutes of moderate exercise most days.         - Take medications as prescribed.        Return if symptoms worsen or fail to improve.

## 2020-09-22 RX ORDER — BUDESONIDE AND FORMOTEROL FUMARATE DIHYDRATE 160; 4.5 UG/1; UG/1
AEROSOL RESPIRATORY (INHALATION)
Qty: 10.2 G | Refills: 12 | Status: SHIPPED | OUTPATIENT
Start: 2020-09-22 | End: 2021-09-30

## 2020-10-22 ENCOUNTER — TELEPHONE (OUTPATIENT)
Dept: INTERNAL MEDICINE | Facility: CLINIC | Age: 55
End: 2020-10-22

## 2020-10-22 NOTE — TELEPHONE ENCOUNTER
Wife said Pt has COPD and is coughing up blood.  She is wanting to schedule an in office appt.  She is requesting a call back to see if Pt can be seen in the office.

## 2020-10-22 NOTE — TELEPHONE ENCOUNTER
Called patient wife Naldo on release; Naldo states that pt has been coughing up some blood that started this morning and is at work today. I did advise Naldo that James does need to be evaluated and to see if he could go to ER. Naldo notified me that pt probably would not be willing to go to ER. She did ask if we had any openings today and I stated we were booked for today and even offered her to ask him if he could go on to the Urgent Care here in [place] since there were no openings with us nor the extenders in office. Naldo stated she would try her best to get him to go to Urgent Care or ER today.

## 2020-12-10 ENCOUNTER — OFFICE VISIT (OUTPATIENT)
Dept: INTERNAL MEDICINE | Facility: CLINIC | Age: 55
End: 2020-12-10

## 2020-12-10 VITALS
HEART RATE: 96 BPM | OXYGEN SATURATION: 96 % | WEIGHT: 207 LBS | SYSTOLIC BLOOD PRESSURE: 116 MMHG | BODY MASS INDEX: 28.98 KG/M2 | HEIGHT: 71 IN | DIASTOLIC BLOOD PRESSURE: 66 MMHG | TEMPERATURE: 98.2 F

## 2020-12-10 DIAGNOSIS — G47.00 INSOMNIA, UNSPECIFIED TYPE: ICD-10-CM

## 2020-12-10 DIAGNOSIS — Z72.0 TOBACCO USE: ICD-10-CM

## 2020-12-10 DIAGNOSIS — F41.9 ANXIETY: ICD-10-CM

## 2020-12-10 DIAGNOSIS — I10 ESSENTIAL HYPERTENSION: Primary | ICD-10-CM

## 2020-12-10 DIAGNOSIS — J44.9 CHRONIC OBSTRUCTIVE PULMONARY DISEASE, UNSPECIFIED COPD TYPE (HCC): ICD-10-CM

## 2020-12-10 DIAGNOSIS — K86.9 PANCREATIC LESION: ICD-10-CM

## 2020-12-10 DIAGNOSIS — R91.1 LUNG NODULE: ICD-10-CM

## 2020-12-10 PROCEDURE — 99214 OFFICE O/P EST MOD 30 MIN: CPT | Performed by: INTERNAL MEDICINE

## 2020-12-10 RX ORDER — NICOTINE 21 MG/24HR
1 PATCH, TRANSDERMAL 24 HOURS TRANSDERMAL EVERY 24 HOURS
Qty: 28 EACH | Refills: 1 | Status: SHIPPED | OUTPATIENT
Start: 2020-12-10 | End: 2021-07-27 | Stop reason: SDDI

## 2020-12-10 RX ORDER — DOXYCYCLINE 100 MG/1
100 CAPSULE ORAL 2 TIMES DAILY
Qty: 28 CAPSULE | Refills: 0 | Status: SHIPPED | OUTPATIENT
Start: 2020-12-10 | End: 2022-01-24

## 2020-12-10 NOTE — PROGRESS NOTES
Subjective   Johnathan Morel is a 55 y.o. male.     Chief Complaint   Patient presents with   • COPD     pt states that he quit smoking for a week now, pt would like patches.     • FMLA     pt has brought [facility] paperwork for to fill out        History of Present Illness   Cough green phlegm for several weeks. Some soa, no fever or chill, wheeze improved after inhaler. No fatigue or body ache. No change in smell or taste. Quit smoking for a week.  Cough and hoarseness improved after quiting smoking but still chew tob.  Lung nodule patient yet to do PET. Blood pressure stable on medication.  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  Anxiety stable now.  Insomnia stable now.    Current Outpatient Medications:   •  hydroCHLOROthiazide (HYDRODIURIL) 12.5 MG tablet, TAKE 1 TABLET EVERY DAY FOR BLOOD PRESSURE AND/OR FLUID, Disp: 30 tablet, Rfl: 2  •  ipratropium-albuterol (DUO-NEB) 0.5-2.5 mg/3 ml nebulizer, Take 3 mL by nebulization Every 4 (Four) Hours As Needed for Wheezing., Disp: 360 mL, Rfl: 1  •  losartan (COZAAR) 100 MG tablet, TAKE 1 TABLET EVERY DAY FOR BLOOD PRESSURE, Disp: 30 tablet, Rfl: 5  •  Symbicort 160-4.5 MCG/ACT inhaler, INHALE 2 PUFFS 2 TIMES A DAY TO PREVENT BREATHING PROBLEMS. RINSE MOUTH AFTER USE., Disp: 10.2 g, Rfl: 12  •  doxycycline (MONODOX) 100 MG capsule, Take 1 capsule by mouth 2 (Two) Times a Day., Disp: 28 capsule, Rfl: 0  •  nicotine (Nicoderm CQ) 14 MG/24HR patch, Place 1 patch on the skin as directed by provider Daily., Disp: 28 each, Rfl: 1    The following portions of the patient's history were reviewed and updated as appropriate: allergies, current medications, past family history, past medical history, past social history, past surgical history and problem list.    Review of Systems   Constitutional: Negative.    Respiratory: Positive for cough, shortness of breath and wheezing.    Cardiovascular: Negative.    Gastrointestinal: Negative.    Musculoskeletal: Negative.    Skin:  Negative.    Neurological: Negative.    Psychiatric/Behavioral: Negative.        Objective   Physical Exam  Neck:      Musculoskeletal: Neck supple.   Cardiovascular:      Rate and Rhythm: Normal rate and regular rhythm.      Heart sounds: Normal heart sounds.   Pulmonary:      Effort: Pulmonary effort is normal.      Breath sounds: Normal breath sounds.   Abdominal:      General: Bowel sounds are normal.   Skin:     General: Skin is warm.   Neurological:      Mental Status: He is alert and oriented to person, place, and time.         All tests have been reviewed.    Assessment/Plan   Diagnoses and all orders for this visit:    Essential hypertension continue medication    Chronic obstructive pulmonary disease, unspecified COPD type (CMS/HCC)  -     doxycycline (MONODOX) 100 MG capsule; Take 1 capsule by mouth 2 (Two) Times a Day.  Continue Mucinex Zyrtec and sondra    Lung nodule need to follow-up with CT scan.  Patient yet to do PET scan recommended by radiologist.  -     CT Chest Without Contrast    Pancreatic lesion unchanged on CT scan    Anxiety continue medication    Insomnia, unspecified type continue medication    Tobacco use patient just quit smoking but still chewing tobacco agreed to take a nicotine patch  -     nicotine (Nicoderm CQ) 14 MG/24HR patch; Place 1 patch on the skin as directed by provider Daily.    1 mo

## 2021-01-27 RX ORDER — HYDROCHLOROTHIAZIDE 12.5 MG/1
TABLET ORAL
Qty: 90 TABLET | Refills: 3 | Status: SHIPPED | OUTPATIENT
Start: 2021-01-27 | End: 2022-04-25

## 2021-04-15 DIAGNOSIS — I10 ESSENTIAL HYPERTENSION: ICD-10-CM

## 2021-04-15 RX ORDER — LOSARTAN POTASSIUM 100 MG/1
TABLET ORAL
Qty: 30 TABLET | Refills: 5 | Status: SHIPPED | OUTPATIENT
Start: 2021-04-15

## 2021-06-07 ENCOUNTER — TELEPHONE (OUTPATIENT)
Dept: INTERNAL MEDICINE | Facility: CLINIC | Age: 56
End: 2021-06-07

## 2021-06-07 NOTE — TELEPHONE ENCOUNTER
Patient did not complete NM Pet scan skull ordered on 05/21/2020. This order is too old to be used. May I cancel the order?

## 2021-07-07 ENCOUNTER — TELEPHONE (OUTPATIENT)
Dept: INTERNAL MEDICINE | Facility: CLINIC | Age: 56
End: 2021-07-07

## 2021-07-07 NOTE — TELEPHONE ENCOUNTER
Patient has a lung mass and yet to do PET scan and the CT scan.  Patient also due for follow-up.  Please inform patient the need to do scans and schedule patient to see me

## 2021-07-08 NOTE — TELEPHONE ENCOUNTER
Contacted patient and attempted to schedule appointment; patient refused and stated that he would contact clinic to schedule appointment at a later date. Patient hung up on me before I was able to discuss more.

## 2021-07-19 NOTE — TELEPHONE ENCOUNTER
Pt has been notified to schedule but has of yet not scheduled. Can I cancel the outdated NM pet scan?

## 2021-07-24 ENCOUNTER — HOSPITAL ENCOUNTER (EMERGENCY)
Facility: HOSPITAL | Age: 56
Discharge: HOME | End: 2021-07-24
Attending: EMERGENCY MEDICINE | Admitting: EMERGENCY MEDICINE

## 2021-07-24 ENCOUNTER — APPOINTMENT (OUTPATIENT)
Dept: GENERAL RADIOLOGY | Facility: HOSPITAL | Age: 56
End: 2021-07-24

## 2021-07-24 VITALS
HEIGHT: 70 IN | HEART RATE: 78 BPM | OXYGEN SATURATION: 97 % | WEIGHT: 205 LBS | TEMPERATURE: 97.4 F | BODY MASS INDEX: 29.35 KG/M2 | SYSTOLIC BLOOD PRESSURE: 108 MMHG | RESPIRATION RATE: 16 BRPM | DIASTOLIC BLOOD PRESSURE: 73 MMHG

## 2021-07-24 DIAGNOSIS — R55 VASOVAGAL SYNCOPE: Primary | ICD-10-CM

## 2021-07-24 DIAGNOSIS — S13.9XXA NECK SPRAIN, INITIAL ENCOUNTER: ICD-10-CM

## 2021-07-24 LAB
ALBUMIN SERPL-MCNC: 3.5 G/DL (ref 3.5–5.2)
ALBUMIN/GLOB SERPL: 1.3 G/DL
ALP SERPL-CCNC: 51 U/L (ref 39–117)
ALT SERPL W P-5'-P-CCNC: 15 U/L (ref 1–41)
ANION GAP SERPL CALCULATED.3IONS-SCNC: 11.7 MMOL/L (ref 5–15)
AST SERPL-CCNC: 17 U/L (ref 1–40)
BASOPHILS # BLD AUTO: 0.04 10*3/MM3 (ref 0–0.2)
BASOPHILS NFR BLD AUTO: 0.5 % (ref 0–1.5)
BILIRUB SERPL-MCNC: 0.3 MG/DL (ref 0–1.2)
BUN SERPL-MCNC: 14 MG/DL (ref 6–20)
BUN/CREAT SERPL: 8.9 (ref 7–25)
CALCIUM SPEC-SCNC: 8.5 MG/DL (ref 8.6–10.5)
CHLORIDE SERPL-SCNC: 101 MMOL/L (ref 98–107)
CO2 SERPL-SCNC: 28.3 MMOL/L (ref 22–29)
CREAT SERPL-MCNC: 1.57 MG/DL (ref 0.76–1.27)
DEPRECATED RDW RBC AUTO: 48.5 FL (ref 37–54)
EOSINOPHIL # BLD AUTO: 0.01 10*3/MM3 (ref 0–0.4)
EOSINOPHIL NFR BLD AUTO: 0.1 % (ref 0.3–6.2)
ERYTHROCYTE [DISTWIDTH] IN BLOOD BY AUTOMATED COUNT: 13.6 % (ref 12.3–15.4)
GFR SERPL CREATININE-BSD FRML MDRD: 46 ML/MIN/1.73
GLOBULIN UR ELPH-MCNC: 2.6 GM/DL
GLUCOSE SERPL-MCNC: 139 MG/DL (ref 65–99)
HCT VFR BLD AUTO: 41.1 % (ref 37.5–51)
HGB BLD-MCNC: 14.3 G/DL (ref 13–17.7)
HOLD SPECIMEN: NORMAL
HOLD SPECIMEN: NORMAL
IMM GRANULOCYTES # BLD AUTO: 0.04 10*3/MM3 (ref 0–0.05)
IMM GRANULOCYTES NFR BLD AUTO: 0.5 % (ref 0–0.5)
LYMPHOCYTES # BLD AUTO: 1.38 10*3/MM3 (ref 0.7–3.1)
LYMPHOCYTES NFR BLD AUTO: 17.6 % (ref 19.6–45.3)
MAGNESIUM SERPL-MCNC: 1.9 MG/DL (ref 1.6–2.6)
MCH RBC QN AUTO: 33.5 PG (ref 26.6–33)
MCHC RBC AUTO-ENTMCNC: 34.8 G/DL (ref 31.5–35.7)
MCV RBC AUTO: 96.3 FL (ref 79–97)
MONOCYTES # BLD AUTO: 0.89 10*3/MM3 (ref 0.1–0.9)
MONOCYTES NFR BLD AUTO: 11.4 % (ref 5–12)
NEUTROPHILS NFR BLD AUTO: 5.46 10*3/MM3 (ref 1.7–7)
NEUTROPHILS NFR BLD AUTO: 69.9 % (ref 42.7–76)
NRBC BLD AUTO-RTO: 0 /100 WBC (ref 0–0.2)
PLATELET # BLD AUTO: 215 10*3/MM3 (ref 140–450)
PMV BLD AUTO: 9.9 FL (ref 6–12)
POTASSIUM SERPL-SCNC: 3.1 MMOL/L (ref 3.5–5.2)
PROT SERPL-MCNC: 6.1 G/DL (ref 6–8.5)
RBC # BLD AUTO: 4.27 10*6/MM3 (ref 4.14–5.8)
SODIUM SERPL-SCNC: 141 MMOL/L (ref 136–145)
TROPONIN T SERPL-MCNC: <0.01 NG/ML (ref 0–0.03)
TROPONIN T SERPL-MCNC: <0.01 NG/ML (ref 0–0.03)
WBC # BLD AUTO: 7.82 10*3/MM3 (ref 3.4–10.8)
WHOLE BLOOD HOLD SPECIMEN: NORMAL

## 2021-07-24 PROCEDURE — 25010000002 KETOROLAC TROMETHAMINE PER 15 MG: Performed by: EMERGENCY MEDICINE

## 2021-07-24 PROCEDURE — 80053 COMPREHEN METABOLIC PANEL: CPT | Performed by: EMERGENCY MEDICINE

## 2021-07-24 PROCEDURE — 85025 COMPLETE CBC W/AUTO DIFF WBC: CPT | Performed by: EMERGENCY MEDICINE

## 2021-07-24 PROCEDURE — 96374 THER/PROPH/DIAG INJ IV PUSH: CPT

## 2021-07-24 PROCEDURE — 84484 ASSAY OF TROPONIN QUANT: CPT | Performed by: EMERGENCY MEDICINE

## 2021-07-24 PROCEDURE — 83735 ASSAY OF MAGNESIUM: CPT | Performed by: EMERGENCY MEDICINE

## 2021-07-24 PROCEDURE — 93005 ELECTROCARDIOGRAM TRACING: CPT | Performed by: EMERGENCY MEDICINE

## 2021-07-24 PROCEDURE — 99284 EMERGENCY DEPT VISIT MOD MDM: CPT

## 2021-07-24 PROCEDURE — 71045 X-RAY EXAM CHEST 1 VIEW: CPT

## 2021-07-24 RX ORDER — SODIUM CHLORIDE 0.9 % (FLUSH) 0.9 %
10 SYRINGE (ML) INJECTION AS NEEDED
Status: DISCONTINUED | OUTPATIENT
Start: 2021-07-24 | End: 2021-07-24 | Stop reason: HOSPADM

## 2021-07-24 RX ORDER — CYCLOBENZAPRINE HCL 10 MG
10 TABLET ORAL 3 TIMES DAILY PRN
Qty: 15 TABLET | Refills: 0 | Status: SHIPPED | OUTPATIENT
Start: 2021-07-24

## 2021-07-24 RX ORDER — KETOROLAC TROMETHAMINE 30 MG/ML
30 INJECTION, SOLUTION INTRAMUSCULAR; INTRAVENOUS EVERY 6 HOURS PRN
Status: DISCONTINUED | OUTPATIENT
Start: 2021-07-24 | End: 2021-07-24 | Stop reason: HOSPADM

## 2021-07-24 RX ADMIN — KETOROLAC TROMETHAMINE 30 MG: 30 INJECTION, SOLUTION INTRAMUSCULAR; INTRAVENOUS at 05:30

## 2021-07-27 ENCOUNTER — OFFICE VISIT (OUTPATIENT)
Dept: INTERNAL MEDICINE | Facility: CLINIC | Age: 56
End: 2021-07-27

## 2021-07-27 VITALS
SYSTOLIC BLOOD PRESSURE: 120 MMHG | HEIGHT: 70 IN | TEMPERATURE: 96.4 F | DIASTOLIC BLOOD PRESSURE: 78 MMHG | BODY MASS INDEX: 27.49 KG/M2 | WEIGHT: 192 LBS | OXYGEN SATURATION: 98 % | HEART RATE: 127 BPM

## 2021-07-27 DIAGNOSIS — E86.0 DEHYDRATION: Primary | ICD-10-CM

## 2021-07-27 DIAGNOSIS — F41.9 ANXIETY: ICD-10-CM

## 2021-07-27 DIAGNOSIS — R91.1 LUNG NODULE: ICD-10-CM

## 2021-07-27 DIAGNOSIS — K86.9 PANCREATIC LESION: ICD-10-CM

## 2021-07-27 DIAGNOSIS — R55 SYNCOPE, UNSPECIFIED SYNCOPE TYPE: ICD-10-CM

## 2021-07-27 DIAGNOSIS — I10 ESSENTIAL HYPERTENSION: ICD-10-CM

## 2021-07-27 DIAGNOSIS — S09.90XA INJURY OF HEAD, INITIAL ENCOUNTER: ICD-10-CM

## 2021-07-27 PROCEDURE — 99214 OFFICE O/P EST MOD 30 MIN: CPT | Performed by: INTERNAL MEDICINE

## 2021-07-27 PROCEDURE — 93000 ELECTROCARDIOGRAM COMPLETE: CPT | Performed by: INTERNAL MEDICINE

## 2021-07-27 NOTE — PROGRESS NOTES
Subjective   Johnathan Morel is a 55 y.o. male.     Chief Complaint   Patient presents with   • Hospital Follow Up Visit   • Loss of Consciousness   • Hypotension       History of Present Illness   Friday at work patient passed out and EMS was called and patient was brought to ER thought to be vasovagal syncope probably caused by dehydration.  No more episode.  Patient complains of occipital area pain after the fall.  No CT scan was done in ER.  Chest x-ray normal.  Creatinine elevated and white blood cell elevated.  Patient denies any fever chills denies any chest pain short of breath palpitation before syncope.  Below is historical record only: Lung nodule patient declined to do CT follow-up.  Recent chest x-ray showed no nodule.  Pancreatic lesion patient yet to do CT scan follow-up.    Current Outpatient Medications:   •  cyclobenzaprine (FLEXERIL) 10 MG tablet, Take 1 tablet by mouth 3 (Three) Times a Day As Needed for Muscle Spasms., Disp: 15 tablet, Rfl: 0  •  ipratropium-albuterol (DUO-NEB) 0.5-2.5 mg/3 ml nebulizer, Take 3 mL by nebulization Every 4 (Four) Hours As Needed for Wheezing., Disp: 360 mL, Rfl: 1  •  Symbicort 160-4.5 MCG/ACT inhaler, INHALE 2 PUFFS 2 TIMES A DAY TO PREVENT BREATHING PROBLEMS. RINSE MOUTH AFTER USE., Disp: 10.2 g, Rfl: 12  •  doxycycline (MONODOX) 100 MG capsule, Take 1 capsule by mouth 2 (Two) Times a Day., Disp: 28 capsule, Rfl: 0  •  hydroCHLOROthiazide (HYDRODIURIL) 12.5 MG tablet, TAKE 1 TABLET EVERY DAY FOR BLOOD PRESSURE AND/OR FLUID, Disp: 90 tablet, Rfl: 3  •  losartan (COZAAR) 100 MG tablet, TAKE 1 TABLET EVERY DAY FOR BLOOD PRESSURE, Disp: 30 tablet, Rfl: 5    The following portions of the patient's history were reviewed and updated as appropriate: allergies, current medications, past family history, past medical history, past social history, past surgical history and problem list.    Review of Systems   Constitutional: Negative.    Respiratory: Negative.     Cardiovascular: Negative.    Gastrointestinal: Negative.    Musculoskeletal: Negative.    Skin: Negative.    Neurological: Positive for syncope.   Psychiatric/Behavioral: Negative.        Objective   Physical Exam  Cardiovascular:      Rate and Rhythm: Normal rate and regular rhythm.      Heart sounds: Normal heart sounds.   Pulmonary:      Effort: Pulmonary effort is normal.      Breath sounds: Normal breath sounds.   Abdominal:      General: Bowel sounds are normal.   Musculoskeletal:      Cervical back: Neck supple.   Skin:     General: Skin is warm.   Neurological:      Mental Status: He is alert and oriented to person, place, and time.           All tests have been reviewed.    Assessment/Plan   Diagnoses and all orders for this visit:    Dehydration creatinine elevated in the hospital recheck.  Encourage patient to have good hydration  -     Basic Metabolic Panel    Essential hypertension normal now continue medication    Anxiety patient declines medication    Lung nodule declines CT scan follow-up    Pancreatic lesion declines a CT scan follow-up    Syncope, unspecified syncope type possibly due to dehydration vasovagal  -     Adult Transthoracic Echo Complete W/ Cont if Necessary Per Protocol  -     US carotid bilateral    Injury of head, initial encounter  -     CT Head Without Contrast    Tachycardia possibly due to anxiety repeat EKG heart rate 104 no significant ST-T changes.        ECG 12 Lead    Date/Time: 7/27/2021 5:36 PM  Performed by: Arya Brewster MD  Authorized by: Arya Brewster MD   Comparison: not compared with previous ECG   Rhythm: sinus rhythm and sinus tachycardia  Rate: normal  Conduction: conduction normal  ST Segments: ST segments normal  QRS axis: normal  Other: no other findings    Clinical impression: normal ECG
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
## 2021-09-30 RX ORDER — BUDESONIDE AND FORMOTEROL FUMARATE DIHYDRATE 160; 4.5 UG/1; UG/1
AEROSOL RESPIRATORY (INHALATION)
Qty: 10.2 G | Refills: 12 | Status: SHIPPED | OUTPATIENT
Start: 2021-09-30

## 2021-09-30 NOTE — TELEPHONE ENCOUNTER
Rx Refill Note  Requested Prescriptions     Pending Prescriptions Disp Refills   • budesonide-formoterol (SYMBICORT) 160-4.5 MCG/ACT inhaler [Pharmacy Med Name: BUDESONIDE-FORMOTEROL 160/4 160-4.5 Aerosol] 10.2 g 12     Sig: INHALE 2 PUFFS 2 TIMES A DAY TO PREVENT BREATHING PROBLEMS. RINSE MOUTH AFTER USE.      Last office visit with prescribing clinician: 7/27/2021      Next office visit with prescribing clinician: Visit date not found            Ninfa Sellers MA  09/30/21, 17:53 EDT

## 2022-01-14 NOTE — TELEPHONE ENCOUNTER
Please review. Protocol failed/ No protocol      Requested Prescriptions   Pending Prescriptions Disp Refills    ALPRAZOLAM 1 MG Oral Tab [Pharmacy Med Name: ALPRAZOLAM 1 MG TABLET] 30 tablet 0     Sig: Take 1 tablet (1 mg total) by mouth daily as needed (p Spoke with patient and tried to schedule an appointment but he stated he would call back and schedule an appointment.

## 2022-01-24 ENCOUNTER — OFFICE VISIT (OUTPATIENT)
Dept: INTERNAL MEDICINE | Facility: CLINIC | Age: 57
End: 2022-01-24

## 2022-01-24 VITALS
HEIGHT: 70 IN | DIASTOLIC BLOOD PRESSURE: 78 MMHG | WEIGHT: 200.8 LBS | BODY MASS INDEX: 28.75 KG/M2 | HEART RATE: 87 BPM | OXYGEN SATURATION: 99 % | SYSTOLIC BLOOD PRESSURE: 120 MMHG | TEMPERATURE: 97.5 F

## 2022-01-24 DIAGNOSIS — I10 ESSENTIAL HYPERTENSION: ICD-10-CM

## 2022-01-24 DIAGNOSIS — L03.116 CELLULITIS OF LEFT LOWER EXTREMITY: Primary | ICD-10-CM

## 2022-01-24 PROCEDURE — 99214 OFFICE O/P EST MOD 30 MIN: CPT | Performed by: NURSE PRACTITIONER

## 2022-01-24 RX ORDER — DOXYCYCLINE HYCLATE 100 MG/1
100 CAPSULE ORAL 2 TIMES DAILY
Qty: 20 CAPSULE | Refills: 0 | Status: SHIPPED | OUTPATIENT
Start: 2022-01-24 | End: 2022-02-03

## 2022-01-24 RX ORDER — MUPIROCIN CALCIUM 20 MG/G
1 CREAM TOPICAL DAILY
Qty: 5 G | Refills: 0 | Status: SHIPPED | OUTPATIENT
Start: 2022-01-24 | End: 2022-01-29

## 2022-04-25 RX ORDER — HYDROCHLOROTHIAZIDE 12.5 MG/1
TABLET ORAL
Qty: 90 TABLET | Refills: 3 | Status: SHIPPED | OUTPATIENT
Start: 2022-04-25

## 2022-04-25 NOTE — TELEPHONE ENCOUNTER
Rx Refill Note  Requested Prescriptions     Pending Prescriptions Disp Refills   • hydroCHLOROthiazide (HYDRODIURIL) 12.5 MG tablet [Pharmacy Med Name: HYDROCHLOROTHIAZIDE 12.5 MG 12.5 Tablet] 90 tablet 3     Sig: TAKE 1 TABLET EVERY DAY FOR BLOOD PRESSURE AND/OR FLUID      Last office visit with prescribing clinician: 7/27/2021      Next office visit with prescribing clinician: Visit date not found            Trinity Marc LPN  04/25/22, 09:00 EDT

## 2022-08-01 ENCOUNTER — TELEPHONE (OUTPATIENT)
Dept: INTERNAL MEDICINE | Facility: CLINIC | Age: 57
End: 2022-08-01

## 2022-08-01 NOTE — TELEPHONE ENCOUNTER
Patient did not complete CT Head without contrast    ordered on 07/27/2021. This order is too old to be used and has been cancelled.

## 2022-08-30 RX ORDER — ALBUTEROL SULFATE 90 UG/1
AEROSOL, METERED RESPIRATORY (INHALATION)
Qty: 8.5 G | Refills: 8 | OUTPATIENT
Start: 2022-08-30

## 2022-10-26 RX ORDER — BUDESONIDE AND FORMOTEROL FUMARATE DIHYDRATE 160; 4.5 UG/1; UG/1
AEROSOL RESPIRATORY (INHALATION)
Qty: 10.2 G | Refills: 12 | OUTPATIENT
Start: 2022-10-26

## 2022-10-26 NOTE — TELEPHONE ENCOUNTER
Rx Refill Note  Requested Prescriptions     Pending Prescriptions Disp Refills   • budesonide-formoterol (SYMBICORT) 160-4.5 MCG/ACT inhaler [Pharmacy Med Name: BUDESONIDE-FORMOTEROL 160/4 160-4.5 Aerosol] 10.2 g 12     Sig: INHALE 2 PUFFS 2 TIMES A DAY TO PREVENT BREATHING PROBLEMS. RINSE MOUTH AFTER USE.      Last office visit with prescribing clinician: 7/27/2021      Next office visit with prescribing clinician: Visit date not found            Ninfa Sellers MA  10/26/22, 13:26 EDT     Attempted to contact patient; no answer and no voicemail available. Patient needs appt with provider for any additional refill.

## 2023-12-22 RX ORDER — BUDESONIDE AND FORMOTEROL FUMARATE DIHYDRATE 160; 4.5 UG/1; UG/1
AEROSOL RESPIRATORY (INHALATION)
Refills: 0 | OUTPATIENT
Start: 2023-12-22

## 2024-01-03 ENCOUNTER — TRANSCRIBE ORDERS (OUTPATIENT)
Dept: LAB | Facility: HOSPITAL | Age: 59
End: 2024-01-03
Payer: COMMERCIAL

## 2024-01-03 DIAGNOSIS — Z01.818 PREOP EXAMINATION: Primary | ICD-10-CM

## (undated) DEVICE — ENDOPATH XCEL BLADELESS TROCARS WITH STABILITY SLEEVES: Brand: ENDOPATH XCEL

## (undated) DEVICE — CLAVICLE STRAP: Brand: DEROYAL

## (undated) DEVICE — ENDOPATH XCEL UNIVERSAL TROCAR STABLILITY SLEEVES: Brand: ENDOPATH XCEL

## (undated) DEVICE — SUT VIC 0/0 UR6 27IN DYED J603H

## (undated) DEVICE — ENDOPOUCH RETRIEVER SPECIMEN RETRIEVAL BAGS: Brand: ENDOPOUCH RETRIEVER

## (undated) DEVICE — MONOPOLAR METZENBAUM SCISSOR, MINI BLADE TIP, DISPOSABLE: Brand: MONOPOLAR METZENBAUM SCISSOR, MINI BLADE TIP, DISPOSABLE

## (undated) DEVICE — RICH GENERAL LAPAROSCOPY: Brand: MEDLINE INDUSTRIES, INC.

## (undated) DEVICE — 2, DISPOSABLE SUCTION/IRRIGATOR WITHOUT DISPOSABLE TIP: Brand: STRYKEFLOW

## (undated) DEVICE — UNDYED MONOFILAMENT (POLYDIOXANONE), ABSORBABLE SYNTHETIC SURGICAL SUTURE: Brand: PDS

## (undated) DEVICE — TP ELECTRD LAP L WR SPLIT33CM

## (undated) DEVICE — GLV SURG SENSICARE W/ALOE PF LF 7.5 STRL

## (undated) DEVICE — KT CATH CHOLANGIOGRA PERC W/BALLO

## (undated) DEVICE — SOL NACL 0.9PCT 1000ML

## (undated) DEVICE — DISPOSABLE MONOPOLAR ENDOSCOPIC CORD 10 FT. (3M): Brand: KIRWAN

## (undated) DEVICE — 3M™ STERI-STRIP™ REINFORCED ADHESIVE SKIN CLOSURES, R1547, 1/2 IN X 4 IN (12 MM X 100 MM), 6 STRIPS/ENVELOPE: Brand: 3M™ STERI-STRIP™

## (undated) DEVICE — CUFF SCD HEMOFORCE SEQ CALF STD MD

## (undated) DEVICE — SOL IRR SALINE 0.9% 100ML STRL